# Patient Record
Sex: FEMALE | Race: ASIAN | NOT HISPANIC OR LATINO | Employment: OTHER | URBAN - METROPOLITAN AREA
[De-identification: names, ages, dates, MRNs, and addresses within clinical notes are randomized per-mention and may not be internally consistent; named-entity substitution may affect disease eponyms.]

---

## 2018-03-05 ENCOUNTER — HOSPITAL ENCOUNTER (EMERGENCY)
Facility: HOSPITAL | Age: 83
Discharge: HOME | End: 2018-03-05
Attending: EMERGENCY MEDICINE
Payer: MEDICARE

## 2018-03-05 VITALS
HEART RATE: 81 BPM | TEMPERATURE: 98 F | WEIGHT: 135.13 LBS | DIASTOLIC BLOOD PRESSURE: 70 MMHG | SYSTOLIC BLOOD PRESSURE: 143 MMHG | OXYGEN SATURATION: 98 % | RESPIRATION RATE: 18 BRPM

## 2018-03-05 DIAGNOSIS — R05.9 COUGH: ICD-10-CM

## 2018-03-05 DIAGNOSIS — R47.9 DIFFICULTY USING VERBAL COMMUNICATION: ICD-10-CM

## 2018-03-05 DIAGNOSIS — Z87.81 HX OF COMPRESSION FRACTURE OF SPINE: ICD-10-CM

## 2018-03-05 DIAGNOSIS — J18.9 PNEUMONIA OF RIGHT UPPER LOBE DUE TO INFECTIOUS ORGANISM: Primary | ICD-10-CM

## 2018-03-05 LAB
ALBUMIN SERPL BCP-MCNC: 3.9 G/DL
ALP SERPL-CCNC: 93 U/L
ALT SERPL W/O P-5'-P-CCNC: 26 U/L
ANION GAP SERPL CALC-SCNC: 12 MMOL/L
AST SERPL-CCNC: 28 U/L
BASOPHILS # BLD AUTO: 0.03 K/UL
BASOPHILS NFR BLD: 0.3 %
BILIRUB SERPL-MCNC: 0.4 MG/DL
BILIRUB UR QL STRIP: NEGATIVE
BUN SERPL-MCNC: 18 MG/DL
CALCIUM SERPL-MCNC: 9.9 MG/DL
CHLORIDE SERPL-SCNC: 101 MMOL/L
CK SERPL-CCNC: 55 U/L
CLARITY UR: CLEAR
CO2 SERPL-SCNC: 27 MMOL/L
COLOR UR: YELLOW
CREAT SERPL-MCNC: 1.2 MG/DL
D DIMER PPP IA.FEU-MCNC: 0.52 MG/L FEU
DIFFERENTIAL METHOD: ABNORMAL
EOSINOPHIL # BLD AUTO: 0.1 K/UL
EOSINOPHIL NFR BLD: 1.1 %
ERYTHROCYTE [DISTWIDTH] IN BLOOD BY AUTOMATED COUNT: 13.5 %
EST. GFR  (AFRICAN AMERICAN): 47 ML/MIN/1.73 M^2
EST. GFR  (NON AFRICAN AMERICAN): 41 ML/MIN/1.73 M^2
GLUCOSE SERPL-MCNC: 99 MG/DL
GLUCOSE UR QL STRIP: NEGATIVE
HCT VFR BLD AUTO: 38.4 %
HGB BLD-MCNC: 12.6 G/DL
HGB UR QL STRIP: NEGATIVE
KETONES UR QL STRIP: NEGATIVE
LEUKOCYTE ESTERASE UR QL STRIP: NEGATIVE
LYMPHOCYTES # BLD AUTO: 2.5 K/UL
LYMPHOCYTES NFR BLD: 25 %
MCH RBC QN AUTO: 31.1 PG
MCHC RBC AUTO-ENTMCNC: 32.8 G/DL
MCV RBC AUTO: 95 FL
MONOCYTES # BLD AUTO: 0.8 K/UL
MONOCYTES NFR BLD: 8.3 %
NEUTROPHILS # BLD AUTO: 6.6 K/UL
NEUTROPHILS NFR BLD: 65.3 %
NITRITE UR QL STRIP: NEGATIVE
PH UR STRIP: 8 [PH] (ref 5–8)
PLATELET # BLD AUTO: 283 K/UL
PMV BLD AUTO: 11.2 FL
POTASSIUM SERPL-SCNC: 3.8 MMOL/L
PROT SERPL-MCNC: 8.4 G/DL
PROT UR QL STRIP: ABNORMAL
RBC # BLD AUTO: 4.05 M/UL
SODIUM SERPL-SCNC: 140 MMOL/L
SP GR UR STRIP: 1.01 (ref 1–1.03)
TROPONIN I SERPL DL<=0.01 NG/ML-MCNC: 0.02 NG/ML
URN SPEC COLLECT METH UR: ABNORMAL
UROBILINOGEN UR STRIP-ACNC: NEGATIVE EU/DL
WBC # BLD AUTO: 10.1 K/UL

## 2018-03-05 PROCEDURE — 99285 EMERGENCY DEPT VISIT HI MDM: CPT

## 2018-03-05 PROCEDURE — 85025 COMPLETE CBC W/AUTO DIFF WBC: CPT

## 2018-03-05 PROCEDURE — 25000003 PHARM REV CODE 250: Performed by: EMERGENCY MEDICINE

## 2018-03-05 PROCEDURE — 84484 ASSAY OF TROPONIN QUANT: CPT

## 2018-03-05 PROCEDURE — 82550 ASSAY OF CK (CPK): CPT

## 2018-03-05 PROCEDURE — 85379 FIBRIN DEGRADATION QUANT: CPT

## 2018-03-05 PROCEDURE — 81003 URINALYSIS AUTO W/O SCOPE: CPT

## 2018-03-05 PROCEDURE — 80053 COMPREHEN METABOLIC PANEL: CPT

## 2018-03-05 RX ORDER — CLOPIDOGREL BISULFATE 75 MG/1
75 TABLET ORAL DAILY
Status: ON HOLD | COMMUNITY
End: 2020-12-14 | Stop reason: SDUPTHER

## 2018-03-05 RX ORDER — KETOROLAC TROMETHAMINE 5 MG/ML
1 SOLUTION OPHTHALMIC 4 TIMES DAILY
Status: ON HOLD | COMMUNITY
End: 2021-01-27 | Stop reason: SDUPTHER

## 2018-03-05 RX ORDER — LEVOFLOXACIN 500 MG/1
250 TABLET, FILM COATED ORAL DAILY
Qty: 9 TABLET | Refills: 0 | Status: SHIPPED | OUTPATIENT
Start: 2018-03-05 | End: 2018-03-14

## 2018-03-05 RX ORDER — NAPROXEN SODIUM 220 MG
220 TABLET ORAL 2 TIMES DAILY PRN
Status: ON HOLD | COMMUNITY
End: 2020-12-14

## 2018-03-05 RX ORDER — AMLODIPINE BESYLATE 10 MG/1
10 TABLET ORAL DAILY
Status: ON HOLD | COMMUNITY
End: 2020-12-14 | Stop reason: SDUPTHER

## 2018-03-05 RX ORDER — VALSARTAN AND HYDROCHLOROTHIAZIDE 320; 12.5 MG/1; MG/1
1 TABLET, FILM COATED ORAL DAILY
Status: ON HOLD | COMMUNITY
End: 2020-12-16 | Stop reason: HOSPADM

## 2018-03-05 RX ORDER — ACETAMINOPHEN 500 MG
500 TABLET ORAL EVERY 6 HOURS PRN
Status: ON HOLD | COMMUNITY
End: 2020-12-14

## 2018-03-05 RX ORDER — MELOXICAM 7.5 MG/1
7.5 TABLET ORAL DAILY
Status: ON HOLD | COMMUNITY
End: 2020-12-14

## 2018-03-05 RX ORDER — MOXIFLOXACIN HYDROCHLORIDE 400 MG/1
400 TABLET ORAL
Status: COMPLETED | OUTPATIENT
Start: 2018-03-05 | End: 2018-03-05

## 2018-03-05 RX ORDER — AMLODIPINE BESYLATE 5 MG/1
5 TABLET ORAL DAILY
Status: ON HOLD | COMMUNITY
End: 2020-12-14

## 2018-03-05 RX ORDER — IBUPROFEN 200 MG
200 TABLET ORAL EVERY 6 HOURS PRN
Status: ON HOLD | COMMUNITY
End: 2020-12-16 | Stop reason: HOSPADM

## 2018-03-05 RX ADMIN — MOXIFLOXACIN HYDROCHLORIDE 400 MG: 400 TABLET, FILM COATED ORAL at 02:03

## 2018-03-05 NOTE — ED PROVIDER NOTES
SCRIBE #1 NOTE: I, Fitz Tang, am scribing for, and in the presence of, Katina Amin DO. I have scribed the entire note.      History      Chief Complaint   Patient presents with    Chest Pain     pt reports chest pain, SOB, productive cough, and generalized body aches x1 week       Review of patient's allergies indicates:  No Known Allergies     HPI   HPI    3/5/2018, 10:10 AM   History obtained from the patient and daughter    Patient was offered MALIK services. Patient does want MALIK services at this time. Full HPI is limited due to language barrier.     History of Present Illness limited due to foreign language barrier: Dana Wyman is a 87 y.o. female patient who presents to the Emergency Department for right sided CP which onset gradually one week ago. Sxs are constant, worse today, and moderate in severity. Pain worsens with cough.  Daughter-in-law notes that she's been coughing for approximately one month.  There are no other mitigating or exacerbating factors noted. Associated sxs include back pain, cough for one month. Daughter in law reports pt has not been communicating as much as she usually does.  Patient has been difficulty communicating for over a year.  There is no numbness or weakness on one side which is new.  Daughter denies any fever, LOC, diaphoresis, leg swelling, sore throat, rhinorrhea, and all other sxs at this time. No further complaints or concerns at this time.       Arrival mode: Personal vehicle     PCP: Radha Cleary MD     Past Medical History:  Hypertension      Past Surgical History:  Past surgical history reviewed not relevant      Family History:  Family history reviewed not relevant      Social History:  No tobacco, alcohol or drugs.    ROS   Review of Systems   Constitutional: Negative for fever.   HENT: Negative for sore throat.    Respiratory: Positive for cough. Negative for shortness of breath.    Cardiovascular: Positive for chest pain.   Gastrointestinal:  Negative for abdominal pain, constipation, diarrhea, nausea and vomiting.   Genitourinary: Negative for dysuria.   Musculoskeletal: Positive for back pain.   Skin: Negative for rash.   Neurological: Negative for seizures, weakness, numbness and headaches.   Hematological: Does not bruise/bleed easily.     Physical Exam      Initial Vitals [03/05/18 0956]   BP Pulse Resp Temp SpO2   (!) 175/75 95 20 98.6 °F (37 °C) 95 %      MAP       108.33          Physical Exam   Musculoskeletal:        Thoracic back: She exhibits deformity. She exhibits no tenderness and no bony tenderness.   Kyphosis is noted.  There is no midline tenderness to the T10, T 11, T12 thoracic vertebra.     Nursing Notes and Vital Signs Reviewed.  Constitutional: Patient is in no acute distress. Well-developed and well-nourished.  Head: Atraumatic. Normocephalic.  Eyes: PERRL. EOM intact. Conjunctivae are not pale. No scleral icterus.  ENT: Mucous membranes are moist. Oropharynx is clear and symmetric.    Neck: Supple. Full ROM. No lymphadenopathy.  Cardiovascular: Regular rate. Regular rhythm. No murmurs, rubs, or gallops. Distal pulses are 2+ and symmetric.  Pulmonary/Chest: No respiratory distress. Clear to auscultation bilaterally. No wheezing or rales.  Abdominal: Soft and non-distended.  There is no tenderness.  No rebound, guarding, or rigidity. Good bowel sounds.  Genitourinary: No CVA tenderness  Musculoskeletal: Moves all extremities. No obvious deformities. No edema. No calf tenderness.  Skin: Warm and dry.  Neurological:  Alert, awake, and appropriate.  no focal deficits noted.    Psychiatric: Normal affect. Good eye contact.     ED Course    Procedures  ED Vital Signs:  Vitals:    03/05/18 0956 03/05/18 1000 03/05/18 1030 03/05/18 1100   BP: (!) 175/75 (!) 175/82 (!) 182/110 (!) 176/76   Pulse: 95 96 97 93   Resp: 20 20 18 16   Temp: 98.6 °F (37 °C)      TempSrc: Oral      SpO2: 95% 97% 98% 96%   Weight: 61.3 kg (135 lb 2.3 oz)        03/05/18 1130 03/05/18 1200 03/05/18 1230 03/05/18 1300   BP:   (!) 191/81 (!) 198/81   Pulse: 90 82 97 101   Resp: 20 (!) 21 18 18   Temp:       TempSrc:       SpO2: 95% (!) 94% 98% (!) 93%   Weight:        03/05/18 1330 03/05/18 1400 03/05/18 1430 03/05/18 1451   BP: (!) 168/77 (!) 151/60 (!) 157/75 (!) 173/95   Pulse: 90 83 84 98   Resp: 11 18 13 16   Temp:    98.3 °F (36.8 °C)   TempSrc:    Oral   SpO2: (!) 93% (!) 90% 96% 98%   Weight:        03/05/18 1500 03/05/18 1530 03/05/18 1600   BP: (!) 163/79 (!) 155/74 (!) 144/67   Pulse: 84 77 79   Resp: 18 (!) 24 20   Temp:      TempSrc:      SpO2: 96% 96% 96%   Weight:          Abnormal Lab Results:  Labs Reviewed   CBC W/ AUTO DIFFERENTIAL - Abnormal; Notable for the following:        Result Value    MCH 31.1 (*)     All other components within normal limits   COMPREHENSIVE METABOLIC PANEL - Abnormal; Notable for the following:     eGFR if  47 (*)     eGFR if non  41 (*)     All other components within normal limits   D DIMER, QUANTITATIVE - Abnormal; Notable for the following:     D-Dimer 0.52 (*)     All other components within normal limits   URINALYSIS - Abnormal; Notable for the following:     Protein, UA Trace (*)     All other components within normal limits   TROPONIN I   CK        All Lab Results:  Results for orders placed or performed during the hospital encounter of 03/05/18   CBC auto differential   Result Value Ref Range    WBC 10.10 3.90 - 12.70 K/uL    RBC 4.05 4.00 - 5.40 M/uL    Hemoglobin 12.6 12.0 - 16.0 g/dL    Hematocrit 38.4 37.0 - 48.5 %    MCV 95 82 - 98 fL    MCH 31.1 (H) 27.0 - 31.0 pg    MCHC 32.8 32.0 - 36.0 g/dL    RDW 13.5 11.5 - 14.5 %    Platelets 283 150 - 350 K/uL    MPV 11.2 9.2 - 12.9 fL    Gran # (ANC) 6.6 1.8 - 7.7 K/uL    Lymph # 2.5 1.0 - 4.8 K/uL    Mono # 0.8 0.3 - 1.0 K/uL    Eos # 0.1 0.0 - 0.5 K/uL    Baso # 0.03 0.00 - 0.20 K/uL    Gran% 65.3 38.0 - 73.0 %    Lymph% 25.0 18.0 - 48.0 %     Mono% 8.3 4.0 - 15.0 %    Eosinophil% 1.1 0.0 - 8.0 %    Basophil% 0.3 0.0 - 1.9 %    Differential Method Automated    Comprehensive metabolic panel   Result Value Ref Range    Sodium 140 136 - 145 mmol/L    Potassium 3.8 3.5 - 5.1 mmol/L    Chloride 101 95 - 110 mmol/L    CO2 27 23 - 29 mmol/L    Glucose 99 70 - 110 mg/dL    BUN, Bld 18 8 - 23 mg/dL    Creatinine 1.2 0.5 - 1.4 mg/dL    Calcium 9.9 8.7 - 10.5 mg/dL    Total Protein 8.4 6.0 - 8.4 g/dL    Albumin 3.9 3.5 - 5.2 g/dL    Total Bilirubin 0.4 0.1 - 1.0 mg/dL    Alkaline Phosphatase 93 55 - 135 U/L    AST 28 10 - 40 U/L    ALT 26 10 - 44 U/L    Anion Gap 12 8 - 16 mmol/L    eGFR if African American 47 (A) >60 mL/min/1.73 m^2    eGFR if non African American 41 (A) >60 mL/min/1.73 m^2   Troponin I   Result Value Ref Range    Troponin I 0.018 0.000 - 0.026 ng/mL   CK   Result Value Ref Range    CPK 55 20 - 180 U/L   D dimer, quantitative   Result Value Ref Range    D-Dimer 0.52 (H) <0.50 mg/L FEU   Urinalysis   Result Value Ref Range    Specimen UA Urine, Clean Catch     Color, UA Yellow Yellow, Straw, Melany    Appearance, UA Clear Clear    pH, UA 8.0 5.0 - 8.0    Specific Gravity, UA 1.010 1.005 - 1.030    Protein, UA Trace (A) Negative    Glucose, UA Negative Negative    Ketones, UA Negative Negative    Bilirubin (UA) Negative Negative    Occult Blood UA Negative Negative    Nitrite, UA Negative Negative    Urobilinogen, UA Negative <2.0 EU/dL    Leukocytes, UA Negative Negative         Imaging Results:  Imaging Results          CT Head Without Contrast (Final result)  Result time 03/05/18 12:20:47    Final result by Romero Reed III, MD (03/05/18 12:20:47)                 Impression:     Mild atrophy with minimal to mild white matter changes which may be related to microvascular disease. No bleed or other acute intracranial event suggested.    All CT scans at this facility use dose modulation, iterative reconstruction, and/or weight based dosing when  appropriate to reduce radiation dose to as low as reasonably achievable.      Electronically signed by: FLORENTINO THOMAS MD  Date:     03/05/18  Time:    12:20              Narrative:    CT of the head without contrast.    Clinical indication: difficulty communicating .The    Standard noncontrast CT scan of the brain. No previous for comparison.    The brain and ventricles show no mass, hydrocephalus, hemorrhage, or midline shift. There are mild changes of atrophy with minimal bilateral white matter changes. No acute/depressed skull fracture.                             X-Ray Chest PA And Lateral (Final result)  Result time 03/05/18 11:21:47    Final result by CORWIN Clinton Sr., MD (03/05/18 11:21:47)                 Impression:        1. There is a mild amount of alveolar consolidation in the right upper lobe. This is consistent with the patient's history and characteristic of pneumonia.  2. There is a severe compression fracture of T11.  3. Osteoporosis  4. There is a mild amount of dextroconvex curvature of the thoracic spine.   5. There is mild cardiomegaly.   6. There is a moderate amount of atherosclerosis.      Electronically signed by: CORWIN CLINTON MD  Date:     03/05/18  Time:    11:21              Narrative:    Two-view chest x-ray    Clinical History:  Cough    Finding: There is mild cardiomegaly. There is a mild amount of alveolar consolidation in the right upper lobe. The left lung is clear. There is no pneumothorax or pleural effusion. There is a mild amount of dextroconvex curvature of the thoracic spine. There is a severe compression fracture of T11. There is diffuse bony demineralization. There is a moderate amount of atherosclerosis.                                        The EKG was ordered, reviewed, and independently interpreted by the ED provider.  Interpretation time: 10:05  Rate: 104 BPM  Rhythm: sinus tachycardia  Interpretation: Nonspecific ST abnormality. Abnormal QRST angle. No  STEMI.      The Emergency Provider reviewed the vital signs and test results, which are outlined above.    ED Discussion     2:45 PM: Reassessed pt. Spoke with Barbara (pt's daughter [phone]) over the phone concerning pt diagnosis of PNA and plan to discharge pt home on PO ABX. Discussed with daughter all pertinent ED information and results. Discussed plan of treatment with daughter. Gave daughter all f/u and return to the ED instructions. All questions and concerns were addressed at this time. Daughter understands and agrees to plan as discussed. Pt is stable for discharge.     I have discussed with patient and/or family/caretaker chest pain precautions, specifically to return for worsening chest pain, shortness of breath, fever, or any concern.  I have low suspicion for cardiopulmonary, vascular, infectious, respiratory, or other emergent medical condition based on my evaluation in the ED.    ED Medication(s):  Medications   moxifloxacin tablet 400 mg (400 mg Oral Given 3/5/18 1450)       New Prescriptions    LEVOFLOXACIN (LEVAQUIN) 500 MG TABLET    Take 0.5 tablets (250 mg total) by mouth once daily.       Follow-up Information     Radha Cleary MD In 2 days.    Specialty:  Cardiology  Why:  Return to the ED:  for confusion, difficulty beathing, weakness or other concerns.  Contact information:  [address] [facility] [address] [phone]                     Medical Decision Making    Medical Decision Making:   Clinical Tests:   Lab Tests: Reviewed and Ordered  Radiological Study: Reviewed and Ordered  Medical Tests: Reviewed and Ordered           Scribe Attestation:   Scribe #1: I performed the above scribed service and the documentation accurately describes the services I performed. I attest to the accuracy of the note.    Attending:   Physician Attestation Statement for Scribe #1: I, Katina Amin DO, personally performed the services described in this documentation, as scribed by Fitz  Oneal Tang, in my presence, and it is both accurate and complete.          Clinical Impression       ICD-10-CM ICD-9-CM   1. Pneumonia of right upper lobe due to infectious organism J18.1 486   2. Difficulty using verbal communication F80.9 784.59   3. Cough R05 786.2   4. Hx of compression fracture of spine - T11 Z87.81 V15.51       Disposition:   Disposition: Discharged  Condition: Stable         Katina Amin, DO  03/05/18 1926

## 2020-12-13 ENCOUNTER — HOSPITAL ENCOUNTER (INPATIENT)
Facility: HOSPITAL | Age: 85
LOS: 2 days | Discharge: HOME HEALTH | DRG: 291 | End: 2020-12-16
Attending: EMERGENCY MEDICINE | Admitting: FAMILY MEDICINE
Payer: MEDICARE

## 2020-12-13 DIAGNOSIS — R60.1 ANASARCA: ICD-10-CM

## 2020-12-13 DIAGNOSIS — I50.9 ACUTE CONGESTIVE HEART FAILURE, UNSPECIFIED HEART FAILURE TYPE: Primary | ICD-10-CM

## 2020-12-13 DIAGNOSIS — I48.91 ATRIAL FIBRILLATION, UNSPECIFIED TYPE: ICD-10-CM

## 2020-12-13 DIAGNOSIS — R79.89 TROPONIN LEVEL ELEVATED: ICD-10-CM

## 2020-12-13 DIAGNOSIS — J18.9 PNEUMONIA OF RIGHT UPPER LOBE DUE TO INFECTIOUS ORGANISM: ICD-10-CM

## 2020-12-13 DIAGNOSIS — I50.41 ACUTE COMBINED SYSTOLIC AND DIASTOLIC CONGESTIVE HEART FAILURE: ICD-10-CM

## 2020-12-13 DIAGNOSIS — I50.43 ACUTE ON CHRONIC COMBINED SYSTOLIC AND DIASTOLIC CONGESTIVE HEART FAILURE: ICD-10-CM

## 2020-12-13 DIAGNOSIS — I48.0 PAROXYSMAL ATRIAL FIBRILLATION: ICD-10-CM

## 2020-12-13 DIAGNOSIS — R91.8 MASS OF UPPER LOBE OF RIGHT LUNG: ICD-10-CM

## 2020-12-13 DIAGNOSIS — I50.31 ACUTE DIASTOLIC CONGESTIVE HEART FAILURE: ICD-10-CM

## 2020-12-13 DIAGNOSIS — I16.1 HYPERTENSIVE EMERGENCY: ICD-10-CM

## 2020-12-13 DIAGNOSIS — R60.9 EDEMA: ICD-10-CM

## 2020-12-13 PROBLEM — I25.10 CORONARY ARTERY DISEASE INVOLVING NATIVE CORONARY ARTERY: Status: ACTIVE | Noted: 2018-09-06

## 2020-12-13 PROBLEM — G30.9 ALZHEIMER'S DEMENTIA WITH BEHAVIORAL DISTURBANCE: Status: ACTIVE | Noted: 2019-03-15

## 2020-12-13 PROBLEM — I10 ESSENTIAL HYPERTENSION: Status: ACTIVE | Noted: 2018-09-06

## 2020-12-13 PROBLEM — I65.29 CAROTID STENOSIS: Status: RESOLVED | Noted: 2018-11-11 | Resolved: 2020-12-13

## 2020-12-13 PROBLEM — I48.19 PERSISTENT ATRIAL FIBRILLATION: Status: ACTIVE | Noted: 2018-09-06

## 2020-12-13 PROBLEM — H91.93 BILATERAL HEARING LOSS: Status: ACTIVE | Noted: 2019-03-14

## 2020-12-13 PROBLEM — F02.818 ALZHEIMER'S DEMENTIA WITH BEHAVIORAL DISTURBANCE: Status: ACTIVE | Noted: 2019-03-15

## 2020-12-13 PROBLEM — N18.30 CKD (CHRONIC KIDNEY DISEASE), STAGE III: Status: ACTIVE | Noted: 2018-09-22

## 2020-12-13 PROBLEM — I65.29 CAROTID STENOSIS: Status: ACTIVE | Noted: 2018-11-11

## 2020-12-13 PROBLEM — I65.23 BILATERAL CAROTID ARTERY STENOSIS: Status: ACTIVE | Noted: 2018-09-08

## 2020-12-13 LAB
ALBUMIN SERPL BCP-MCNC: 2.8 G/DL (ref 3.5–5.2)
ALP SERPL-CCNC: 116 U/L (ref 55–135)
ALT SERPL W/O P-5'-P-CCNC: 18 U/L (ref 10–44)
ANION GAP SERPL CALC-SCNC: 7 MMOL/L (ref 8–16)
APTT BLDCRRT: 29.9 SEC (ref 21–32)
AST SERPL-CCNC: 31 U/L (ref 10–40)
BACTERIA #/AREA URNS HPF: ABNORMAL /HPF
BASOPHILS # BLD AUTO: 0.04 K/UL (ref 0–0.2)
BASOPHILS NFR BLD: 0.8 % (ref 0–1.9)
BILIRUB SERPL-MCNC: 0.6 MG/DL (ref 0.1–1)
BILIRUB UR QL STRIP: NEGATIVE
BNP SERPL-MCNC: 2844 PG/ML (ref 0–99)
BUN SERPL-MCNC: 23 MG/DL (ref 8–23)
CALCIUM SERPL-MCNC: 8.7 MG/DL (ref 8.7–10.5)
CHLORIDE SERPL-SCNC: 107 MMOL/L (ref 95–110)
CLARITY UR: CLEAR
CO2 SERPL-SCNC: 26 MMOL/L (ref 23–29)
COLOR UR: YELLOW
CREAT SERPL-MCNC: 1.7 MG/DL (ref 0.5–1.4)
DIFFERENTIAL METHOD: ABNORMAL
EOSINOPHIL # BLD AUTO: 0.3 K/UL (ref 0–0.5)
EOSINOPHIL NFR BLD: 5.2 % (ref 0–8)
ERYTHROCYTE [DISTWIDTH] IN BLOOD BY AUTOMATED COUNT: 18 % (ref 11.5–14.5)
EST. GFR  (AFRICAN AMERICAN): 30 ML/MIN/1.73 M^2
EST. GFR  (NON AFRICAN AMERICAN): 26 ML/MIN/1.73 M^2
GLUCOSE SERPL-MCNC: 109 MG/DL (ref 70–110)
GLUCOSE UR QL STRIP: NEGATIVE
HCT VFR BLD AUTO: 37.2 % (ref 37–48.5)
HGB BLD-MCNC: 11.1 G/DL (ref 12–16)
HGB UR QL STRIP: ABNORMAL
HYALINE CASTS #/AREA URNS LPF: 0 /LPF
IMM GRANULOCYTES # BLD AUTO: 0.01 K/UL (ref 0–0.04)
IMM GRANULOCYTES NFR BLD AUTO: 0.2 % (ref 0–0.5)
INR PPP: 1.1 (ref 0.8–1.2)
KETONES UR QL STRIP: NEGATIVE
LACTATE SERPL-SCNC: 1.2 MMOL/L (ref 0.5–2.2)
LACTATE SERPL-SCNC: 1.7 MMOL/L (ref 0.5–2.2)
LEUKOCYTE ESTERASE UR QL STRIP: NEGATIVE
LYMPHOCYTES # BLD AUTO: 1.3 K/UL (ref 1–4.8)
LYMPHOCYTES NFR BLD: 26.9 % (ref 18–48)
MAGNESIUM SERPL-MCNC: 2.2 MG/DL (ref 1.6–2.6)
MCH RBC QN AUTO: 27.1 PG (ref 27–31)
MCHC RBC AUTO-ENTMCNC: 29.8 G/DL (ref 32–36)
MCV RBC AUTO: 91 FL (ref 82–98)
MICROSCOPIC COMMENT: ABNORMAL
MONOCYTES # BLD AUTO: 0.4 K/UL (ref 0.3–1)
MONOCYTES NFR BLD: 8.1 % (ref 4–15)
NEUTROPHILS # BLD AUTO: 2.8 K/UL (ref 1.8–7.7)
NEUTROPHILS NFR BLD: 58.8 % (ref 38–73)
NITRITE UR QL STRIP: NEGATIVE
NRBC BLD-RTO: 0 /100 WBC
PH UR STRIP: 7 [PH] (ref 5–8)
PHOSPHATE SERPL-MCNC: 2.9 MG/DL (ref 2.7–4.5)
PHOSPHATE SERPL-MCNC: 3.3 MG/DL (ref 2.7–4.5)
PLATELET # BLD AUTO: 155 K/UL (ref 150–350)
PMV BLD AUTO: ABNORMAL FL (ref 9.2–12.9)
POTASSIUM SERPL-SCNC: 4 MMOL/L (ref 3.5–5.1)
PROT SERPL-MCNC: 7.4 G/DL (ref 6–8.4)
PROT UR QL STRIP: ABNORMAL
PROTHROMBIN TIME: 11.6 SEC (ref 9–12.5)
RBC # BLD AUTO: 4.1 M/UL (ref 4–5.4)
RBC #/AREA URNS HPF: 5 /HPF (ref 0–4)
SARS-COV-2 RDRP RESP QL NAA+PROBE: NEGATIVE
SODIUM SERPL-SCNC: 140 MMOL/L (ref 136–145)
SP GR UR STRIP: 1.01 (ref 1–1.03)
TROPONIN I SERPL DL<=0.01 NG/ML-MCNC: 0.03 NG/ML (ref 0–0.03)
URN SPEC COLLECT METH UR: ABNORMAL
UROBILINOGEN UR STRIP-ACNC: NEGATIVE EU/DL
WBC # BLD AUTO: 4.83 K/UL (ref 3.9–12.7)
WBC #/AREA URNS HPF: 1 /HPF (ref 0–5)

## 2020-12-13 PROCEDURE — 83605 ASSAY OF LACTIC ACID: CPT

## 2020-12-13 PROCEDURE — U0002 COVID-19 LAB TEST NON-CDC: HCPCS

## 2020-12-13 PROCEDURE — 99291 CRITICAL CARE FIRST HOUR: CPT | Mod: 25

## 2020-12-13 PROCEDURE — 99203 PR OFFICE/OUTPT VISIT, NEW, LEVL III, 30-44 MIN: ICD-10-PCS | Mod: 25,,, | Performed by: INTERNAL MEDICINE

## 2020-12-13 PROCEDURE — G0378 HOSPITAL OBSERVATION PER HR: HCPCS

## 2020-12-13 PROCEDURE — 96365 THER/PROPH/DIAG IV INF INIT: CPT

## 2020-12-13 PROCEDURE — 94761 N-INVAS EAR/PLS OXIMETRY MLT: CPT

## 2020-12-13 PROCEDURE — 99900035 HC TECH TIME PER 15 MIN (STAT)

## 2020-12-13 PROCEDURE — 96375 TX/PRO/DX INJ NEW DRUG ADDON: CPT

## 2020-12-13 PROCEDURE — 83735 ASSAY OF MAGNESIUM: CPT

## 2020-12-13 PROCEDURE — 93005 ELECTROCARDIOGRAM TRACING: CPT

## 2020-12-13 PROCEDURE — 87040 BLOOD CULTURE FOR BACTERIA: CPT

## 2020-12-13 PROCEDURE — 84484 ASSAY OF TROPONIN QUANT: CPT

## 2020-12-13 PROCEDURE — 36415 COLL VENOUS BLD VENIPUNCTURE: CPT

## 2020-12-13 PROCEDURE — 84100 ASSAY OF PHOSPHORUS: CPT

## 2020-12-13 PROCEDURE — 93010 ELECTROCARDIOGRAM REPORT: CPT | Mod: ,,, | Performed by: INTERNAL MEDICINE

## 2020-12-13 PROCEDURE — 96376 TX/PRO/DX INJ SAME DRUG ADON: CPT

## 2020-12-13 PROCEDURE — 85610 PROTHROMBIN TIME: CPT

## 2020-12-13 PROCEDURE — 25000003 PHARM REV CODE 250: Performed by: FAMILY MEDICINE

## 2020-12-13 PROCEDURE — 25000003 PHARM REV CODE 250: Performed by: EMERGENCY MEDICINE

## 2020-12-13 PROCEDURE — 80053 COMPREHEN METABOLIC PANEL: CPT

## 2020-12-13 PROCEDURE — 85025 COMPLETE CBC W/AUTO DIFF WBC: CPT

## 2020-12-13 PROCEDURE — 63600175 PHARM REV CODE 636 W HCPCS: Performed by: FAMILY MEDICINE

## 2020-12-13 PROCEDURE — 63600175 PHARM REV CODE 636 W HCPCS: Performed by: EMERGENCY MEDICINE

## 2020-12-13 PROCEDURE — 85730 THROMBOPLASTIN TIME PARTIAL: CPT

## 2020-12-13 PROCEDURE — 81000 URINALYSIS NONAUTO W/SCOPE: CPT

## 2020-12-13 PROCEDURE — 27000221 HC OXYGEN, UP TO 24 HOURS

## 2020-12-13 PROCEDURE — 84100 ASSAY OF PHOSPHORUS: CPT | Mod: 91

## 2020-12-13 PROCEDURE — 83880 ASSAY OF NATRIURETIC PEPTIDE: CPT

## 2020-12-13 PROCEDURE — 99203 OFFICE O/P NEW LOW 30 MIN: CPT | Mod: 25,,, | Performed by: INTERNAL MEDICINE

## 2020-12-13 PROCEDURE — 93010 EKG 12-LEAD: ICD-10-PCS | Mod: ,,, | Performed by: INTERNAL MEDICINE

## 2020-12-13 RX ORDER — FUROSEMIDE 10 MG/ML
40 INJECTION INTRAMUSCULAR; INTRAVENOUS
Status: DISCONTINUED | OUTPATIENT
Start: 2020-12-13 | End: 2020-12-16 | Stop reason: HOSPADM

## 2020-12-13 RX ORDER — DOXYCYCLINE HYCLATE 100 MG
100 TABLET ORAL EVERY 12 HOURS
Status: DISCONTINUED | OUTPATIENT
Start: 2020-12-13 | End: 2020-12-16 | Stop reason: HOSPADM

## 2020-12-13 RX ORDER — ASPIRIN 325 MG
325 TABLET ORAL
Status: COMPLETED | OUTPATIENT
Start: 2020-12-13 | End: 2020-12-13

## 2020-12-13 RX ORDER — FUROSEMIDE 10 MG/ML
60 INJECTION INTRAMUSCULAR; INTRAVENOUS
Status: COMPLETED | OUTPATIENT
Start: 2020-12-13 | End: 2020-12-13

## 2020-12-13 RX ORDER — FAMOTIDINE 20 MG/1
20 TABLET, FILM COATED ORAL 2 TIMES DAILY
Status: DISCONTINUED | OUTPATIENT
Start: 2020-12-13 | End: 2020-12-13

## 2020-12-13 RX ORDER — ATORVASTATIN CALCIUM 40 MG/1
40 TABLET, FILM COATED ORAL DAILY
Status: DISCONTINUED | OUTPATIENT
Start: 2020-12-14 | End: 2020-12-16 | Stop reason: HOSPADM

## 2020-12-13 RX ORDER — ATORVASTATIN CALCIUM 40 MG/1
40 TABLET, FILM COATED ORAL DAILY
COMMUNITY

## 2020-12-13 RX ORDER — LOSARTAN POTASSIUM 25 MG/1
25 TABLET ORAL DAILY
Status: ON HOLD | COMMUNITY
End: 2020-12-14 | Stop reason: SDUPTHER

## 2020-12-13 RX ORDER — ACETAMINOPHEN 325 MG/1
650 TABLET ORAL EVERY 4 HOURS PRN
Status: DISCONTINUED | OUTPATIENT
Start: 2020-12-13 | End: 2020-12-16 | Stop reason: HOSPADM

## 2020-12-13 RX ORDER — HEPARIN SODIUM,PORCINE/D5W 25000/250
0-40 INTRAVENOUS SOLUTION INTRAVENOUS CONTINUOUS
Status: DISCONTINUED | OUTPATIENT
Start: 2020-12-13 | End: 2020-12-15

## 2020-12-13 RX ORDER — LABETALOL HYDROCHLORIDE 5 MG/ML
20 INJECTION, SOLUTION INTRAVENOUS
Status: COMPLETED | OUTPATIENT
Start: 2020-12-13 | End: 2020-12-13

## 2020-12-13 RX ORDER — CETIRIZINE HYDROCHLORIDE 10 MG/1
10 TABLET ORAL DAILY
Status: ON HOLD | COMMUNITY
End: 2020-12-14

## 2020-12-13 RX ORDER — FAMOTIDINE 20 MG/1
20 TABLET, FILM COATED ORAL DAILY
Status: DISCONTINUED | OUTPATIENT
Start: 2020-12-14 | End: 2020-12-16 | Stop reason: HOSPADM

## 2020-12-13 RX ORDER — ONDANSETRON 8 MG/1
8 TABLET, ORALLY DISINTEGRATING ORAL EVERY 8 HOURS PRN
Status: DISCONTINUED | OUTPATIENT
Start: 2020-12-13 | End: 2020-12-16 | Stop reason: HOSPADM

## 2020-12-13 RX ORDER — SODIUM CHLORIDE 0.9 % (FLUSH) 0.9 %
10 SYRINGE (ML) INJECTION
Status: DISCONTINUED | OUTPATIENT
Start: 2020-12-13 | End: 2020-12-16 | Stop reason: HOSPADM

## 2020-12-13 RX ADMIN — NITROGLYCERIN 1 INCH: 20 OINTMENT TOPICAL at 01:12

## 2020-12-13 RX ADMIN — FUROSEMIDE 60 MG: 10 INJECTION, SOLUTION INTRAMUSCULAR; INTRAVENOUS at 12:12

## 2020-12-13 RX ADMIN — DOXYCYCLINE HYCLATE 100 MG: 100 TABLET, COATED ORAL at 08:12

## 2020-12-13 RX ADMIN — DOXYCYCLINE HYCLATE 100 MG: 100 TABLET, COATED ORAL at 03:12

## 2020-12-13 RX ADMIN — CEFTRIAXONE 1 G: 1 INJECTION, SOLUTION INTRAVENOUS at 02:12

## 2020-12-13 RX ADMIN — ASPIRIN 325 MG ORAL TABLET 325 MG: 325 PILL ORAL at 01:12

## 2020-12-13 RX ADMIN — FUROSEMIDE 40 MG: 10 INJECTION, SOLUTION INTRAMUSCULAR; INTRAVENOUS at 05:12

## 2020-12-13 RX ADMIN — LABETALOL HYDROCHLORIDE 20 MG: 5 INJECTION INTRAVENOUS at 01:12

## 2020-12-13 RX ADMIN — HEPARIN SODIUM AND DEXTROSE 12 UNITS/KG/HR: 10000; 5 INJECTION INTRAVENOUS at 11:12

## 2020-12-13 NOTE — CONSULTS
[facility]  Cardiology  Consult Note    Patient Name: Dana Wyman  MRN: [id number]  Admission Date: 12/13/2020  Hospital Length of Stay: 0 days  Code Status: Full Code   Attending Provider: Bry Jensen DO   Consulting Provider: Hansel Gray MD  Primary Care Physician: Radha Cleary MD  Principal Problem:Acute congestive heart failure    Patient information was obtained from patient and ER records.     Inpatient consult to Cardiology  Consult performed by: Hansel Gray MD  Consult ordered by: Bry Jensen DO        Subjective:       HPI:   88 yo female, consult for CHF and bradycardia  PMH CAD s/p PCI at [facility], HTN chronic afib on Plavix, lung mass stable, CKD. Ukrainian speaking    Admitted for SOB and leg swelling up to lower abd.  In ER, /119 mmHG and EKG showed Afib HR 79. Had one dose of labetalol IVP.  BNP 2844 Troponin 0.034, HGN 11 and Cr 1.7  CXR pulm edema  EKG Afib  CT chest abd showed anasarca, pleural effusion and lung mass        Past Medical History:   Diagnosis Date    Aortic atherosclerosis     Atrial fibrillation     Coronary artery disease     h/o stent in LAD and circumflex    Dementia     Hearing impairment     Hypertension     Mass of upper lobe of right lung     chronic/stable. was followed by pulm at [facility] and opted for no diagnosis/tx due to slow growth/age.       Past Surgical History:   Procedure Laterality Date    HYSTERECTOMY         Review of patient's allergies indicates:  No Known Allergies    No current facility-administered medications on file prior to encounter.      Current Outpatient Medications on File Prior to Encounter   Medication Sig    valsartan-hydrochlorothiazide (DIOVAN-HCT) 320-12.5 mg per tablet Take 1 tablet by mouth once daily.    acetaminophen (TYLENOL) 500 MG tablet Take 500 mg by mouth every 6 (six) hours as needed for Pain.    albuterol sulfate (VENTOLIN INHL) Inhale into the lungs.    amLODIPine (NORVASC) 10 MG tablet Take  10 mg by mouth once daily.    amLODIPine (NORVASC) 5 MG tablet Take 5 mg by mouth once daily.    aspirin-caffeine (LYNDSEY BACK AND BODY) 500-32.5 mg Tab Take by mouth 2 (two) times daily as needed.    atorvastatin (LIPITOR) 40 MG tablet Take 40 mg by mouth once daily.    cetirizine (ZYRTEC) 10 MG tablet Take 10 mg by mouth once daily.    clopidogrel (PLAVIX) 75 mg tablet Take 75 mg by mouth once daily.    ibuprofen (ADVIL,MOTRIN) 200 MG tablet Take 200 mg by mouth every 6 (six) hours as needed for Pain.    ketorolac 0.5% (ACULAR) 0.5 % Drop 1 drop 4 (four) times daily.    losartan (COZAAR) 25 MG tablet Take 25 mg by mouth once daily.    meloxicam (MOBIC) 7.5 MG tablet Take 7.5 mg by mouth once daily.    naproxen sodium (ANAPROX) 220 MG tablet Take 220 mg by mouth 2 (two) times daily as needed.    ranitidine (ZANTAC) 150 MG tablet Take 150 mg by mouth 2 (two) times daily as needed for Heartburn.     Family History     None        Tobacco Use    Smoking status: Never Smoker   Substance and Sexual Activity    Alcohol use: No    Drug use: No    Sexual activity: Not on file     Review of Systems   Constitution: Positive for malaise/fatigue. Negative for decreased appetite, diaphoresis, fever and night sweats.   HENT: Negative for nosebleeds.    Eyes: Negative for blurred vision and double vision.   Cardiovascular: Positive for dyspnea on exertion and leg swelling. Negative for chest pain, claudication, irregular heartbeat, near-syncope, orthopnea, palpitations, paroxysmal nocturnal dyspnea and syncope.   Respiratory: Negative for cough, shortness of breath, sleep disturbances due to breathing, snoring, sputum production and wheezing.    Endocrine: Negative for cold intolerance and polyuria.   Hematologic/Lymphatic: Does not bruise/bleed easily.   Skin: Negative for rash.   Musculoskeletal: Negative for back pain, falls, joint pain, joint swelling and neck pain.   Gastrointestinal: Negative for abdominal  pain, heartburn, nausea and vomiting.   Genitourinary: Negative for dysuria, frequency and hematuria.   Neurological: Negative for difficulty with concentration, dizziness, focal weakness, headaches, light-headedness, numbness, seizures and weakness.   Psychiatric/Behavioral: Negative for depression, memory loss and substance abuse. The patient does not have insomnia.    Allergic/Immunologic: Negative for HIV exposure and hives.     Objective:     Vital Signs (Most Recent):  Temp: 97.2 °F (36.2 °C) (12/13/20 1553)  Pulse: (!) 48 (12/13/20 1553)  Resp: 20 (12/13/20 1553)  BP: (!) 168/72 (12/13/20 1553)  SpO2: 100 % (12/13/20 1553) Vital Signs (24h Range):  Temp:  [97.2 °F (36.2 °C)-98.6 °F (37 °C)] 97.2 °F (36.2 °C)  Pulse:  [48-78] 48  Resp:  [17-24] 20  SpO2:  [97 %-100 %] 100 %  BP: (166-239)/() 168/72     Weight: 66.4 kg (146 lb 6.2 oz)  There is no height or weight on file to calculate BMI.    SpO2: 100 %  O2 Device (Oxygen Therapy): nasal cannula      Intake/Output Summary (Last 24 hours) at 12/13/2020 1703  Last data filed at 12/13/2020 1514  Gross per 24 hour   Intake 50 ml   Output 2000 ml   Net -1950 ml       Lines/Drains/Airways     Drain            Female External Urinary Catheter 12/13/20 1220 less than 1 day          Peripheral Intravenous Line                 Peripheral IV - Single Lumen 12/13/20 1136 20 G Right Antecubital less than 1 day                Physical Exam   Constitutional: She appears well-nourished.   HENT:   Head: Normocephalic.   Eyes: Pupils are equal, round, and reactive to light.   Neck: Normal carotid pulses and no JVD present. Carotid bruit is not present. No thyromegaly present.   Cardiovascular: Normal heart sounds and normal pulses. An irregularly irregular rhythm present.  No extrasystoles are present. Bradycardia present. PMI is not displaced. Exam reveals no gallop and no S3.   No murmur heard.  Pulmonary/Chest: No stridor. No respiratory distress. She has decreased  breath sounds in the right lower field and the left lower field.   Abdominal: Soft. Bowel sounds are normal. There is no abdominal tenderness. There is no rebound.   Musculoskeletal: Normal range of motion.   Skin: Skin is intact. No rash noted.       Significant Labs:   ABG: No results for input(s): PH, PCO2, HCO3, POCSATURATED, BE in the last 48 hours., Blood Culture: No results for input(s): LABBLOO in the last 48 hours., BMP:   Recent Labs   Lab 12/13/20  1135         K 4.0      CO2 26   BUN 23   CREATININE 1.7*   CALCIUM 8.7   MG 2.2   , CMP   Recent Labs   Lab 12/13/20  1135      K 4.0      CO2 26      BUN 23   CREATININE 1.7*   CALCIUM 8.7   PROT 7.4   ALBUMIN 2.8*   BILITOT 0.6   ALKPHOS 116   AST 31   ALT 18   ANIONGAP 7*   ESTGFRAFRICA 30*   EGFRNONAA 26*   , CBC   Recent Labs   Lab 12/13/20  1135   WBC 4.83   HGB 11.1*   HCT 37.2      , INR No results for input(s): INR, PROTIME in the last 48 hours., Lipid Panel No results for input(s): CHOL, HDL, LDLCALC, TRIG, CHOLHDL in the last 48 hours. and Troponin   Recent Labs   Lab 12/13/20  1135   TROPONINI 0.034*       Significant Imaging: EKG:      Assessment and Plan:     * Acute congestive heart failure  CHF and anasarca    Echo in AM  Continue diuresis  DASh and fluid restriction      Elevated troponin  Demanding ischemia from CHF and uncontrolled HTN    Trending troponin    Persistent atrial fibrillation  Bradycardia. Asymptomatic. Caused by Labetalol given in ER    Avoid BB and CCB  Start Heparin gtt      Essential hypertension  Amlodipine and ARB as indicated per   DASH    Coronary artery disease involving native coronary artery  Continue ASA and Lipitor          VTE Risk Mitigation (From admission, onward)         Ordered     apixaban tablet 2.5 mg  2 times daily      12/13/20 1731     IP VTE HIGH RISK PATIENT  Once      12/13/20 1543     Place sequential compression device  Until discontinued      12/13/20  0952                Thank you for your consult. I will follow-up with patient. Please contact us if you have any additional questions.    Hansel Gray MD  Cardiology   [facility]

## 2020-12-13 NOTE — ASSESSMENT & PLAN NOTE
Complicates communication. She speaks Mohawk but daughter reports that she doesn't always comprehend

## 2020-12-13 NOTE — SUBJECTIVE & OBJECTIVE
Past Medical History:   Diagnosis Date    Aortic atherosclerosis     Atrial fibrillation     Coronary artery disease     h/o stent in LAD and circumflex    Dementia     Hearing impairment     Hypertension     Mass of upper lobe of right lung     chronic/stable. was followed by pulm at [facility] and opted for no diagnosis/tx due to slow growth/age.       Past Surgical History:   Procedure Laterality Date    HYSTERECTOMY         Review of patient's allergies indicates:  No Known Allergies    No current facility-administered medications on file prior to encounter.      Current Outpatient Medications on File Prior to Encounter   Medication Sig    valsartan-hydrochlorothiazide (DIOVAN-HCT) 320-12.5 mg per tablet Take 1 tablet by mouth once daily.    acetaminophen (TYLENOL) 500 MG tablet Take 500 mg by mouth every 6 (six) hours as needed for Pain.    albuterol sulfate (VENTOLIN INHL) Inhale into the lungs.    amLODIPine (NORVASC) 10 MG tablet Take 10 mg by mouth once daily.    amLODIPine (NORVASC) 5 MG tablet Take 5 mg by mouth once daily.    aspirin-caffeine (LYNDSEY BACK AND BODY) 500-32.5 mg Tab Take by mouth 2 (two) times daily as needed.    atorvastatin (LIPITOR) 40 MG tablet Take 40 mg by mouth once daily.    cetirizine (ZYRTEC) 10 MG tablet Take 10 mg by mouth once daily.    clopidogrel (PLAVIX) 75 mg tablet Take 75 mg by mouth once daily.    ibuprofen (ADVIL,MOTRIN) 200 MG tablet Take 200 mg by mouth every 6 (six) hours as needed for Pain.    ketorolac 0.5% (ACULAR) 0.5 % Drop 1 drop 4 (four) times daily.    losartan (COZAAR) 25 MG tablet Take 25 mg by mouth once daily.    meloxicam (MOBIC) 7.5 MG tablet Take 7.5 mg by mouth once daily.    naproxen sodium (ANAPROX) 220 MG tablet Take 220 mg by mouth 2 (two) times daily as needed.    ranitidine (ZANTAC) 150 MG tablet Take 150 mg by mouth 2 (two) times daily as needed for Heartburn.     Family History     None        Tobacco Use    Smoking status:  Never Smoker   Substance and Sexual Activity    Alcohol use: No    Drug use: No    Sexual activity: Not on file     Review of Systems   Constitution: Positive for malaise/fatigue. Negative for decreased appetite, diaphoresis, fever and night sweats.   HENT: Negative for nosebleeds.    Eyes: Negative for blurred vision and double vision.   Cardiovascular: Positive for dyspnea on exertion and leg swelling. Negative for chest pain, claudication, irregular heartbeat, near-syncope, orthopnea, palpitations, paroxysmal nocturnal dyspnea and syncope.   Respiratory: Negative for cough, shortness of breath, sleep disturbances due to breathing, snoring, sputum production and wheezing.    Endocrine: Negative for cold intolerance and polyuria.   Hematologic/Lymphatic: Does not bruise/bleed easily.   Skin: Negative for rash.   Musculoskeletal: Negative for back pain, falls, joint pain, joint swelling and neck pain.   Gastrointestinal: Negative for abdominal pain, heartburn, nausea and vomiting.   Genitourinary: Negative for dysuria, frequency and hematuria.   Neurological: Negative for difficulty with concentration, dizziness, focal weakness, headaches, light-headedness, numbness, seizures and weakness.   Psychiatric/Behavioral: Negative for depression, memory loss and substance abuse. The patient does not have insomnia.    Allergic/Immunologic: Negative for HIV exposure and hives.     Objective:     Vital Signs (Most Recent):  Temp: 97.2 °F (36.2 °C) (12/13/20 1553)  Pulse: (!) 48 (12/13/20 1553)  Resp: 20 (12/13/20 1553)  BP: (!) 168/72 (12/13/20 1553)  SpO2: 100 % (12/13/20 1553) Vital Signs (24h Range):  Temp:  [97.2 °F (36.2 °C)-98.6 °F (37 °C)] 97.2 °F (36.2 °C)  Pulse:  [48-78] 48  Resp:  [17-24] 20  SpO2:  [97 %-100 %] 100 %  BP: (166-239)/() 168/72     Weight: 66.4 kg (146 lb 6.2 oz)  There is no height or weight on file to calculate BMI.    SpO2: 100 %  O2 Device (Oxygen Therapy): nasal  cannula      Intake/Output Summary (Last 24 hours) at 12/13/2020 1703  Last data filed at 12/13/2020 1514  Gross per 24 hour   Intake 50 ml   Output 2000 ml   Net -1950 ml       Lines/Drains/Airways     Drain            Female External Urinary Catheter 12/13/20 1220 less than 1 day          Peripheral Intravenous Line                 Peripheral IV - Single Lumen 12/13/20 1136 20 G Right Antecubital less than 1 day                Physical Exam   Constitutional: She appears well-nourished.   HENT:   Head: Normocephalic.   Eyes: Pupils are equal, round, and reactive to light.   Neck: Normal carotid pulses and no JVD present. Carotid bruit is not present. No thyromegaly present.   Cardiovascular: Normal heart sounds and normal pulses. An irregularly irregular rhythm present.  No extrasystoles are present. Bradycardia present. PMI is not displaced. Exam reveals no gallop and no S3.   No murmur heard.  Pulmonary/Chest: No stridor. No respiratory distress. She has decreased breath sounds in the right lower field and the left lower field.   Abdominal: Soft. Bowel sounds are normal. There is no abdominal tenderness. There is no rebound.   Musculoskeletal: Normal range of motion.   Skin: Skin is intact. No rash noted.       Significant Labs:   ABG: No results for input(s): PH, PCO2, HCO3, POCSATURATED, BE in the last 48 hours., Blood Culture: No results for input(s): LABBLOO in the last 48 hours., BMP:   Recent Labs   Lab 12/13/20  1135         K 4.0      CO2 26   BUN 23   CREATININE 1.7*   CALCIUM 8.7   MG 2.2   , CMP   Recent Labs   Lab 12/13/20  1135      K 4.0      CO2 26      BUN 23   CREATININE 1.7*   CALCIUM 8.7   PROT 7.4   ALBUMIN 2.8*   BILITOT 0.6   ALKPHOS 116   AST 31   ALT 18   ANIONGAP 7*   ESTGFRAFRICA 30*   EGFRNONAA 26*   , CBC   Recent Labs   Lab 12/13/20  1135   WBC 4.83   HGB 11.1*   HCT 37.2      , INR No results for input(s): INR, PROTIME in the last 48  hours., Lipid Panel No results for input(s): CHOL, HDL, LDLCALC, TRIG, CHOLHDL in the last 48 hours. and Troponin   Recent Labs   Lab 12/13/20  1135   TROPONINI 0.034*       Significant Imaging: EKG:

## 2020-12-13 NOTE — ASSESSMENT & PLAN NOTE
Bradycardia. Asymptomatic. Caused by Labetalol given in ER    Avoid BB and CCB  Start Heparin gtt

## 2020-12-13 NOTE — ASSESSMENT & PLAN NOTE
History of diastolic CHF her echocardiogram and probably fibrillation 2018.  Echo and morning  Cardiology consult.  Discussed with Dr. Valdivia.  Continue aggressive diuresis with IV furosemide but monitor renal function closely.  Admit to tele

## 2020-12-13 NOTE — ED NOTES
Pt c/o per EMS is painful lower stomach. Patient keeps motioning to lower abdomen but is unable to explain to staff what is wrong due to communication barrier. Cyndie brought to bedside for Citizen of [place] translation with no success as the  explains the dialect the patient is speaking is not the type of [place]. There is no emergency contact in patients chart. EMS reports patient family at home were no help as they did not speak any English either.    Patient moved to ED room 10, patient assisted onto stretcher and changed into a gown. Patient placed on cardiac monitor, continuous pulse oximetry and automatic blood pressure cuff. Bed placed in low locked position, side rails up x 2, call light is within reach of patient, alarms set and turned on for monitor and pulse ox, will continue to monitor.    Patient identifies self as Dana Wyman      LOC: The patient is awake, alert.  APPEARANCE: Patient resting comfortably and in no acute distress, patient is clean and well groomed, patient's clothing is properly fastened.  SKIN: The skin is warm and dry, color consistent with ethnicity, patient has normal skin turgor and moist mucus membranes, skin intact, no breakdown or bruising noted.  MUSCULOSKELETAL: Patient moving all extremities well, no obvious swelling or deformities noted.  RESPIRATORY: Airway is open and patent, respirations are spontaneous, patient has a normal effort and rate, no accessory muscle use noted.  CARDIAC: Patient has a normal rate and rhythm, 2+ peripheral edema noted to upper and lower bilateral lower extremities, trace edema to bilateral upper extremities, lower abdomen hard and swollen, capillary refill < 3 seconds.  ABDOMEN: Abdomen is swollen, hard to palpation at lower quadrants.   NEUROLOGIC: PERRL, eyes open spontaneously, behavior appropriate to situation, unable to follow commands as patient does not under stand english and the  at bedside is unable to communicate  patients specific [place] dialect, facial expression symmetrical, bilateral hand grasp equal and even, purposeful motor response noted.

## 2020-12-13 NOTE — HPI
89 yo female, consult for CHF and bradycardia  PMH CAD s/p PCI at [facility], HTN chronic afib on Plavix, lung mass stable, CKD. Bangladeshi speaking    Admitted for SOB and leg swelling up to lower abd.  In ER, /119 mmHG and EKG showed Afib HR 79. Had one dose of labetalol IVP.  BNP 2844 Troponin 0.034, HGN 11 and Cr 1.7  CXR pulm edema  EKG Afib  CT chest abd showed anasarca, pleural effusion and lung mass

## 2020-12-13 NOTE — H&P
[facility] Hospital Medicine  History & Physical    Patient Name: Dana Wyman  MRN: [id number]  Admission Date: 12/13/2020  Attending Physician: Bry Jensen DO   Primary Care Provider: Radha Cleary MD         Patient information was obtained from relative(s), past medical records and ER records.     Subjective:     Principal Problem:Acute congestive heart failure    Chief Complaint:   Chief Complaint   Patient presents with    Shortness of Breath     found to have pulse ox of 86 upon EMS arrival, placed on 2 L NC        HPI: 89-year-old Greek female presenting today for acute anasarca and shortness of breath.  She has past medical history significant for diastolic congestive heart failure with last echo available from 2018 at [facility].  She also has history of CAD and persistent atrial fibrillation.  Med reconciliation difficult to complete at this time but wrist per discussion with family member it sounds like she was out of some of her medications.  Her primary care physician is Dr. Tommie Butler and I reviewed his note from July for history as well as speaking to daughter Barbara ([phone])  She was given 1 time dose furosemide of 60 mg IV in the emergency room and diuresed 2 L before arriving on telemetry.  It was noted that she had heart rate of 78 on EKG but currently in the mid to low 40 range.  Consulted with cardiology who will see patient.  Felt that it may be secondary to 1 time dose of labetalol.  Plan is to continue focus on diuresis.  Echocardiogram in the morning.    Past Medical History:   Diagnosis Date    Aortic atherosclerosis     Atrial fibrillation     Carotid stenosis     CKD (chronic kidney disease) stage 3, GFR 30-59 ml/min     Coronary artery disease     h/o stent in LAD and circumflex    Dementia     Hearing impairment     Hypertension     Mass of upper lobe of right lung     chronic/stable. was followed by pulm at [facility] and opted for no  diagnosis/tx due to slow growth/age.       Past Surgical History:   Procedure Laterality Date    HYSTERECTOMY         Review of patient's allergies indicates:  No Known Allergies    No current facility-administered medications on file prior to encounter.      Current Outpatient Medications on File Prior to Encounter   Medication Sig    valsartan-hydrochlorothiazide (DIOVAN-HCT) 320-12.5 mg per tablet Take 1 tablet by mouth once daily.    acetaminophen (TYLENOL) 500 MG tablet Take 500 mg by mouth every 6 (six) hours as needed for Pain.    albuterol sulfate (VENTOLIN INHL) Inhale into the lungs.    amLODIPine (NORVASC) 10 MG tablet Take 10 mg by mouth once daily.    amLODIPine (NORVASC) 5 MG tablet Take 5 mg by mouth once daily.    aspirin-caffeine (LYNDSEY BACK AND BODY) 500-32.5 mg Tab Take by mouth 2 (two) times daily as needed.    atorvastatin (LIPITOR) 40 MG tablet Take 40 mg by mouth once daily.    cetirizine (ZYRTEC) 10 MG tablet Take 10 mg by mouth once daily.    clopidogrel (PLAVIX) 75 mg tablet Take 75 mg by mouth once daily.    ibuprofen (ADVIL,MOTRIN) 200 MG tablet Take 200 mg by mouth every 6 (six) hours as needed for Pain.    ketorolac 0.5% (ACULAR) 0.5 % Drop 1 drop 4 (four) times daily.    losartan (COZAAR) 25 MG tablet Take 25 mg by mouth once daily.    meloxicam (MOBIC) 7.5 MG tablet Take 7.5 mg by mouth once daily.    naproxen sodium (ANAPROX) 220 MG tablet Take 220 mg by mouth 2 (two) times daily as needed.    ranitidine (ZANTAC) 150 MG tablet Take 150 mg by mouth 2 (two) times daily as needed for Heartburn.     Family History     Problem Relation (Age of Onset)    No Known Problems Mother, Father        Tobacco Use    Smoking status: Never Smoker   Substance and Sexual Activity    Alcohol use: No    Drug use: No    Sexual activity: Not Currently     Review of Systems   Unable to perform ROS: Dementia   Cardiovascular: Positive for leg swelling.     Objective:     Vital Signs  (Most Recent):  Temp: 97.2 °F (36.2 °C) (12/13/20 1553)  Pulse: (!) 48 (12/13/20 1553)  Resp: 20 (12/13/20 1553)  BP: (!) 168/72 (12/13/20 1553)  SpO2: 100 % (12/13/20 1553) Vital Signs (24h Range):  Temp:  [97.2 °F (36.2 °C)-98.6 °F (37 °C)] 97.2 °F (36.2 °C)  Pulse:  [48-78] 48  Resp:  [17-24] 20  SpO2:  [97 %-100 %] 100 %  BP: (166-239)/() 168/72     Weight: 66.4 kg (146 lb 6.2 oz)  There is no height or weight on file to calculate BMI.    Physical Exam  Constitutional:       General: She is not in acute distress.     Appearance: She is well-developed. She is not diaphoretic.   HENT:      Head: Normocephalic and atraumatic.      Nose: Nose normal.   Eyes:      General:         Right eye: No discharge.         Left eye: No discharge.      Conjunctiva/sclera: Conjunctivae normal.      Pupils: Pupils are equal, round, and reactive to light.   Neck:      Thyroid: No thyromegaly.   Cardiovascular:      Rate and Rhythm: Regular rhythm. Bradycardia present.      Pulses: Normal pulses.      Heart sounds: No murmur.   Pulmonary:      Effort: Pulmonary effort is normal. No respiratory distress.      Breath sounds: Rales (bilateral to mid lung fields) present.   Abdominal:      General: There is no distension.      Palpations: Abdomen is soft.   Musculoskeletal:         General: Swelling (pitting edema to lower abdomen) present.      Right lower leg: Edema (3+ pitting B/L) present.      Left lower leg: Edema present.   Skin:     Findings: No rash.   Neurological:      Mental Status: She is alert and oriented to person, place, and time.   Psychiatric:         Behavior: Behavior normal.      Comments: Pleasantly demented           CRANIAL NERVES     CN III, IV, VI   Pupils are equal, round, and reactive to light.       Significant Labs:   BMP:   Recent Labs   Lab 12/13/20  1135         K 4.0      CO2 26   BUN 23   CREATININE 1.7*   CALCIUM 8.7   MG 2.2     CBC:   Recent Labs   Lab 12/13/20  1135   WBC  4.83   HGB 11.1*   HCT 37.2        CMP:   Recent Labs   Lab 12/13/20  1135      K 4.0      CO2 26      BUN 23   CREATININE 1.7*   CALCIUM 8.7   PROT 7.4   ALBUMIN 2.8*   BILITOT 0.6   ALKPHOS 116   AST 31   ALT 18   ANIONGAP 7*   EGFRNONAA 26*     Cardiac Markers:   Recent Labs   Lab 12/13/20  1308   BNP 2,844*     Coagulation: No results for input(s): PT, INR, APTT in the last 48 hours.  Magnesium:   Recent Labs   Lab 12/13/20  1135   MG 2.2       Significant Imaging: I have reviewed and interpreted all pertinent imaging results/findings within the past 24 hours.    Assessment/Plan:     * Acute congestive heart failure  History of diastolic CHF her echocardiogram and probably fibrillation 2018.  Echo and morning  Cardiology consult.  Discussed with Dr. Valdivia.  Continue aggressive diuresis with IV furosemide but monitor renal function closely.  Admit to tele      Persistent atrial fibrillation  Will restart on eliquis 2.5      Mass of upper lobe of right lung  As above      Essential hypertension  Elevated. Monitor at this time. Diuresis will likely help      CKD (chronic kidney disease), stage III  Decreased GFR. Monitor closely with diuresis      Bilateral hearing loss  Complicating communication even in [place]an with       Bilateral carotid artery stenosis  Unclear whether she has been on these. Will resume       Alzheimer's dementia with behavioral disturbance  Complicates communication. She speaks [place] but daughter reports that she doesn't always comprehend      VTE Risk Mitigation (From admission, onward)         Ordered     IP VTE HIGH RISK PATIENT  Once      12/13/20 1543     Place sequential compression device  Until discontinued      12/13/20 1543                   Bry Jensen DO  Department of Hospital Medicine   [facility]

## 2020-12-13 NOTE — ED PROVIDER NOTES
SCRIBE #1 NOTE: I, Berenice Hammond, am scribing for, and in the presence of, Cayetano Calle Jr., MD. I have scribed the entire note.       History     Chief Complaint   Patient presents with    Shortness of Breath     found to have pulse ox of 86 upon EMS arrival, placed on 2 L NC     Review of patient's allergies indicates:  No Known Allergies      History of Present Illness     HPI    12/13/2020, 11:38 AM  History limited secondary to language barrier.         History of Present Illness: Dana Wyman is a 89 y.o. female patient with a PMHx of HTN who presents to the Emergency Department for evaluation of bilateral leg swelling and swelling to abdominal wall. Per [facility], patient's pulse ox was 86% on arrival and she was placed on 2 L nc. History was unable to be obtained. Per [facility] , patient speaks a dialect of [place] that they do not have an  for.       Arrival mode: [facility]    PCP: Radha Cleary MD        Past Medical History:  Past Medical History:   Diagnosis Date    Hypertension        Past Surgical History:  No past surgical history on file.      Family History:  No family history on file.    Social History:  Social History     Tobacco Use    Smoking status: Never Smoker   Substance and Sexual Activity    Alcohol use: No    Drug use: No    Sexual activity: Not on file        Review of Systems     Review of Systems   Unable to perform ROS: Other   Language barrier.     Physical Exam     Initial Vitals [12/13/20 1108]   BP Pulse Resp Temp SpO2   (!) 170/90 68 (!) 24 98.5 °F (36.9 °C) 97 %      MAP       --          Physical Exam  Nursing Notes and Vital Signs Reviewed.  Constitutional: Patient is in no acute distress. Well-developed and well-nourished.  Head: Atraumatic. Normocephalic.  Eyes: PERRL. EOM intact. Conjunctivae are not pale. No scleral icterus.  ENT: Mucous membranes are moist. Oropharynx is clear and symmetric.    Neck: Supple. Full ROM. No  lymphadenopathy.  Cardiovascular: Regular rate. Regular rhythm. No murmurs, rubs, or gallops. Distal pulses are 2+ and symmetric.  Pulmonary/Chest: No respiratory distress. Clear to auscultation bilaterally. No wheezing or rales.  Abdominal: Soft and non-distended.  There is no tenderness.  No rebound, guarding, or rigidity. Good bowel sounds.  Genitourinary: No CVA tenderness  Musculoskeletal: Moves all extremities. No obvious deformities. There is diffuse edema from the  Ft to the upper abdominal wall.  Skin: Warm and dry.  Neurological:  Alert, awake, and appropriate.  Normal speech.  No acute focal neurological deficits are appreciated.  Psychiatric: Normal affect. Good eye contact. Appropriate in content.       ED Course   Critical Care    Date/Time: 12/13/2020 2:11 PM  Performed by: Cayetano Calle Jr., MD  Authorized by: Cayetano Calle Jr., MD   Direct patient critical care time: 11 minutes  Additional history critical care time: 0 minutes  Ordering / reviewing critical care time: 9 minutes  Documentation critical care time: 5 minutes  Consulting other physicians critical care time: 5 minutes  Consult with family critical care time: 0 minutes  Other critical care time: 0 minutes  Total critical care time (exclusive of procedural time) : 30 minutes  Critical care time was exclusive of teaching time and separately billable procedures and treating other patients.  Critical care was necessary to treat or prevent imminent or life-threatening deterioration of the following conditions: acute CHF, Afib, pneumonia, elevated troponin, hypertensive emergency.  Critical care was time spent personally by me on the following activities: blood draw for specimens, development of treatment plan with patient or surrogate, discussions with consultants, interpretation of cardiac output measurements, evaluation of patient's response to treatment, examination of patient, obtaining history from patient or surrogate, ordering and  performing treatments and interventions, ordering and review of laboratory studies, ordering and review of radiographic studies, pulse oximetry, re-evaluation of patient's condition and review of old charts.        ED Vital Signs:  Vitals:    12/13/20 1108 12/13/20 1116 12/13/20 1134 12/13/20 1330   BP: (!) 170/90   (!) 239/107   Pulse: 68  62 78   Resp: (!) 24   17   Temp: 98.5 °F (36.9 °C)      TempSrc: Oral      SpO2: 97%   100%   Weight:  69.8 kg (153 lb 14.4 oz)      12/13/20 1335   BP: (!) (P) 222/97   Pulse: 66   Resp: 17   Temp:    TempSrc:    SpO2: 100%   Weight:        Abnormal Lab Results:  Labs Reviewed   CBC W/ AUTO DIFFERENTIAL - Abnormal; Notable for the following components:       Result Value    Hemoglobin 11.1 (*)     MCHC 29.8 (*)     RDW 18.0 (*)     All other components within normal limits   COMPREHENSIVE METABOLIC PANEL - Abnormal; Notable for the following components:    Creatinine 1.7 (*)     Albumin 2.8 (*)     Anion Gap 7 (*)     eGFR if  30 (*)     eGFR if non  26 (*)     All other components within normal limits   URINALYSIS, REFLEX TO URINE CULTURE - Abnormal; Notable for the following components:    Protein, UA 1+ (*)     Occult Blood UA 2+ (*)     All other components within normal limits    Narrative:     Specimen Source->Urine   B-TYPE NATRIURETIC PEPTIDE - Abnormal; Notable for the following components:    BNP 2,844 (*)     All other components within normal limits   TROPONIN I - Abnormal; Notable for the following components:    Troponin I 0.034 (*)     All other components within normal limits   URINALYSIS MICROSCOPIC - Abnormal; Notable for the following components:    RBC, UA 5 (*)     All other components within normal limits    Narrative:     Specimen Source->Urine   CULTURE, BLOOD   CULTURE, BLOOD   LACTIC ACID, PLASMA   MAGNESIUM   PHOSPHORUS   SARS-COV-2 RNA AMPLIFICATION, QUAL   B-TYPE NATRIURETIC PEPTIDE   TROPONIN I   LACTIC ACID, PLASMA         All Lab Results:  Results for orders placed or performed during the hospital encounter of 12/13/20   CBC auto differential   Result Value Ref Range    WBC 4.83 3.90 - 12.70 K/uL    RBC 4.10 4.00 - 5.40 M/uL    Hemoglobin 11.1 (L) 12.0 - 16.0 g/dL    Hematocrit 37.2 37.0 - 48.5 %    MCV 91 82 - 98 fL    MCH 27.1 27.0 - 31.0 pg    MCHC 29.8 (L) 32.0 - 36.0 g/dL    RDW 18.0 (H) 11.5 - 14.5 %    Platelets 155 150 - 350 K/uL    MPV SEE COMMENT 9.2 - 12.9 fL    Immature Granulocytes 0.2 0.0 - 0.5 %    Gran # (ANC) 2.8 1.8 - 7.7 K/uL    Immature Grans (Abs) 0.01 0.00 - 0.04 K/uL    Lymph # 1.3 1.0 - 4.8 K/uL    Mono # 0.4 0.3 - 1.0 K/uL    Eos # 0.3 0.0 - 0.5 K/uL    Baso # 0.04 0.00 - 0.20 K/uL    nRBC 0 0 /100 WBC    Gran % 58.8 38.0 - 73.0 %    Lymph % 26.9 18.0 - 48.0 %    Mono % 8.1 4.0 - 15.0 %    Eosinophil % 5.2 0.0 - 8.0 %    Basophil % 0.8 0.0 - 1.9 %    Differential Method Automated    Comprehensive metabolic panel   Result Value Ref Range    Sodium 140 136 - 145 mmol/L    Potassium 4.0 3.5 - 5.1 mmol/L    Chloride 107 95 - 110 mmol/L    CO2 26 23 - 29 mmol/L    Glucose 109 70 - 110 mg/dL    BUN 23 8 - 23 mg/dL    Creatinine 1.7 (H) 0.5 - 1.4 mg/dL    Calcium 8.7 8.7 - 10.5 mg/dL    Total Protein 7.4 6.0 - 8.4 g/dL    Albumin 2.8 (L) 3.5 - 5.2 g/dL    Total Bilirubin 0.6 0.1 - 1.0 mg/dL    Alkaline Phosphatase 116 55 - 135 U/L    AST 31 10 - 40 U/L    ALT 18 10 - 44 U/L    Anion Gap 7 (L) 8 - 16 mmol/L    eGFR if African American 30 (A) >60 mL/min/1.73 m^2    eGFR if non African American 26 (A) >60 mL/min/1.73 m^2   Lactic Acid Plasma #1   Result Value Ref Range    Lactate (Lactic Acid) 1.7 0.5 - 2.2 mmol/L   Magnesium   Result Value Ref Range    Magnesium 2.2 1.6 - 2.6 mg/dL   Phosphorus   Result Value Ref Range    Phosphorus 2.9 2.7 - 4.5 mg/dL   Urinalysis, Reflex to Urine Culture Urine, Clean Catch    Specimen: Urine   Result Value Ref Range    Specimen UA Urine, Clean Catch     Color, UA Yellow  Yellow, Straw, Melany    Appearance, UA Clear Clear    pH, UA 7.0 5.0 - 8.0    Specific Gravity, UA 1.015 1.005 - 1.030    Protein, UA 1+ (A) Negative    Glucose, UA Negative Negative    Ketones, UA Negative Negative    Bilirubin (UA) Negative Negative    Occult Blood UA 2+ (A) Negative    Nitrite, UA Negative Negative    Urobilinogen, UA Negative <2.0 EU/dL    Leukocytes, UA Negative Negative   COVID-19 Rapid Screening   Result Value Ref Range    SARS-CoV-2 RNA, Amplification, Qual Negative Negative   BNP   Result Value Ref Range    BNP 2,844 (H) 0 - 99 pg/mL   Troponin I   Result Value Ref Range    Troponin I 0.034 (H) 0.000 - 0.026 ng/mL   Urinalysis Microscopic   Result Value Ref Range    RBC, UA 5 (H) 0 - 4 /hpf    WBC, UA 1 0 - 5 /hpf    Bacteria None None-Occ /hpf    Hyaline Casts, UA 0 0-1/lpf /lpf    Microscopic Comment SEE COMMENT        Imaging Results:  Imaging Results          CT Chest Abdomen Pelvis Without Contrast (XPD) (Final result)  Result time 12/13/20 13:24:53   Procedure changed from CT Abdomen Pelvis  Without Contrast     Final result by Alex Mckeon MD (12/13/20 13:24:53)                 Impression:      1. Anasarca with generalized marked body wall edema chest, abdomen, and pelvis.  Bilateral dependent pleural effusions, moderate on the right and small on the left.  Small volume ascites.  2. Moderate cardiomegaly.  Extensive atherosclerosis chest, abdomen, and pelvis.  3. Right upper lobe lung mass, 4.2 cm, with associated thin pleuroparenchymal bands and adjacent patchy ground-glass opacities.  4. Mosaic perfusion pattern alternating geographic increased and decreased density bilateral upper and lower lobe distribution, particularly lower lobes with associated septal thickening, probable pulmonary edema sequela superimposed upon small airway process.  5. Multilevel advanced degenerative thoracolumbar spine.  Chronic marked T11 compression fracture deformity.  Mild compression fracture  versus Schmorl's node superior endplate L1.  All CT scans at this facility are performed  using dose modulation techniques as appropriate to performed exam including the following:  automated exposure control; adjustment of mA and/or kV according to the patients size (this includes techniques or standardized protocols for targeted exams where dose is matched to indication/reason for exam: i.e. extremities or head);  iterative reconstruction technique.      Electronically signed by: Alex Mckeon MD  Date:    12/13/2020  Time:    13:24             Narrative:    EXAMINATION:  CT CHEST ABDOMEN PELVIS WITHOUT CONTRAST(XPD)    CLINICAL HISTORY:  Abdominal distension;Abdominal pain, acute, nonlocalized;anasarca;    TECHNIQUE:  Chest, abdomen, and pelvic CT performed without contrast.  Coronal and sagittal reformations.    COMPARISON:  Chest x-ray 12/13/2020.    FINDINGS:  Chest CT.  There is anasarca with generalized marked body wall edema.    There are dots of air associated with the right internal jugular/subclavian vein junction and axillary vein likely related to peripheral IV access.    Moderate cardiomegaly.  Extensive coronary artery and aortic atherosclerosis.  Normal caliber aorta.  Pulmonary arteries are enlarged.    Enlarged subcarinal lymph node, 3.8 x 2.3 cm.  AP window lymph node 2.3 x 0.9 cm.  No hilar adenopathy.    Trachea and central bronchi are patent.    Bilateral dependent pleural effusions, moderate on the right and small on the left.  There is a 4.2 x 3.6 x 2.2 cm right upper lobe lung mass with associated thin pleuroparenchymal bands and adjacent patchy ground-glass opacities.  There is a mosaic perfusion pattern    with geographic alternating areas of increased and decreased density in bilateral upper and lower lobe distribution, particularly involving the lower lobes with associated septal thickening, probable pulmonary edema sequela superimposed upon small airway process.    Degenerative thoracic  spine.  Chronic appearing T11 compression fracture deformity with a prominent anterior wedging, 57 5% loss of height.  No retropulsion.    Abdominal CT.  The there is anasarca with generalized marked body wall edema.    Small volume ascites, perihepatic, perisplenic, and lower pericolic gutter/pelvic cul-de-sac distribution.    Noncontrast evaluation liver, spleen, pancreas, adrenal glands, and kidneys is unremarkable.  Bilateral pelvicaliectasis.  No mary hydronephrosis.    Normal caliber tortuous aorta.  There is extensive atherosclerosis.  No adenopathy.  Gallbladder is contracted.  No bile duct dilatation.    No bowel obstruction.  Normal appendix.  The GI tract is otherwise unremarkable.    Multilevel severe lumbar degenerative disc disease sequela.  Superior endplate Schmorl's node versus mild compression fracture L1.    Pelvic CT.  There is anasarca with generalized marked body wall edema.  The pelvic ring is intact.  Small pelvic ascites.  Extensive atherosclerosis.  Pelvic bowel loops are unremarkable.  Ureters and urinary bladder are normal.  Uterus and ovaries are not identified.                               X-Ray Chest 1 View (Final result)  Result time 12/13/20 11:50:40    Final result by Alex Mckeon MD (12/13/20 11:50:40)                 Impression:      Abnormal chest x-ray.  See discussion above.      Electronically signed by: Alex Mckeon MD  Date:    12/13/2020  Time:    11:50             Narrative:    EXAMINATION:  XR CHEST 1 VIEW    CLINICAL HISTORY:  Sepsis;    FINDINGS:  Comparison study 03/05/2018.  The heart is enlarged.  Tortuous atherosclerotic aorta.  The pulmonary vasculature appears congested.  There are bilateral pleural effusions blunting the costophrenic angles, new since prior study.  There is a 4 cm opacity right upper lobe, similar to although more pronounced compared to the prior examination, chronic infiltrate versus lung mass.  Recommend chest CT to further evaluate.                                  The EKG was ordered, reviewed, and independently interpreted by the ED provider.  Interpretation time: 11:25  Rate: 78 BPM  Rhythm: AFib wtih a competing junctional pacemaker  Interpretation: Rightward axis. Nonspecific T wave abnormality. No STEMI.           The Emergency Provider reviewed the vital signs and test results, which are outlined above.     ED Discussion     2:22 PM: Discussed case with HAROON Boles ([facility]). Dr. Jensen agrees with current care and management of pt and accepts admission.   Admitting Service: \[facility]\"" medicine  Admitting Physician: Dr. Jensen  Admit to: obs tele    2:23 PM: Re-evaluated pt. Pt is ready for admit.     2:29 PM    Patient is stable.  Blood pressures improved.  The patient speaks and unknown dialect of via the [employer] that is unable to be translated by Bruno.  Patient's contact information is for the hospital [facility] all places.  We have no ability to translate however on physical exam the patient has gross anasarca.  We have reviewed old records.  Patient has a history of chronic Afib, right upper lobe lung mass as well as CHF.  Patient's workup shows what may be a postobstructive pneumonia as well as an elevated BNP and troponin consistent with decompensated CHF.  Will admit for diuresis and further evaluation and workup.    Medical Decision Making:   Clinical Tests:   Lab Tests: Ordered and Reviewed  Radiological Study: Ordered and Reviewed  Medical Tests: Ordered and Reviewed           ED Medication(s):  Medications   cefTRIAXone (ROCEPHIN) 1 g/50 mL D5W IVPB (1 g Intravenous New Bag 12/13/20 1400)   doxycycline tablet 100 mg (has no administration in time range)   furosemide injection 60 mg (60 mg Intravenous Given 12/13/20 1208)   labetaloL injection 20 mg (20 mg Intravenous Given 12/13/20 1355)   aspirin tablet 325 mg (325 mg Oral Given 12/13/20 1355)   nitroGLYCERIN 2% TD oint ointment 1 inch (1 inch Topical (Top) Given  12/13/20 5463)       Scribe Attestation:   Scribe #1: I performed the above scribed service and the documentation accurately describes the services I performed. I attest to the accuracy of the note.     Attending:   Physician Attestation Statement for Scribe #1: I, Cayetano Calle Jr., MD, personally performed the services described in this documentation, as scribed by Berenice Hammond, in my presence, and it is both accurate and complete.           Clinical Impression       ICD-10-CM ICD-9-CM   1. Acute congestive heart failure, unspecified heart failure type  I50.9 428.0   2. Edema  R60.9 782.3   3. Atrial fibrillation, unspecified type  I48.91 427.31   4. Mass of upper lobe of right lung  R91.8 786.6   5. Pneumonia of right upper lobe due to infectious organism  J18.9 486   6. Troponin level elevated  R77.8 790.6   7. Hypertensive emergency  I16.1 401.9       Disposition:   Disposition: Placed in Observation  Condition: Fair         Cayetano Calle Jr., MD  12/13/20 2927

## 2020-12-13 NOTE — SUBJECTIVE & OBJECTIVE
Past Medical History:   Diagnosis Date    Aortic atherosclerosis     Atrial fibrillation     Carotid stenosis     CKD (chronic kidney disease) stage 3, GFR 30-59 ml/min     Coronary artery disease     h/o stent in LAD and circumflex    Dementia     Hearing impairment     Hypertension     Mass of upper lobe of right lung     chronic/stable. was followed by pulm at [facility] and opted for no diagnosis/tx due to slow growth/age.       Past Surgical History:   Procedure Laterality Date    HYSTERECTOMY         Review of patient's allergies indicates:  No Known Allergies    No current facility-administered medications on file prior to encounter.      Current Outpatient Medications on File Prior to Encounter   Medication Sig    valsartan-hydrochlorothiazide (DIOVAN-HCT) 320-12.5 mg per tablet Take 1 tablet by mouth once daily.    acetaminophen (TYLENOL) 500 MG tablet Take 500 mg by mouth every 6 (six) hours as needed for Pain.    albuterol sulfate (VENTOLIN INHL) Inhale into the lungs.    amLODIPine (NORVASC) 10 MG tablet Take 10 mg by mouth once daily.    amLODIPine (NORVASC) 5 MG tablet Take 5 mg by mouth once daily.    aspirin-caffeine (LYNDSEY BACK AND BODY) 500-32.5 mg Tab Take by mouth 2 (two) times daily as needed.    atorvastatin (LIPITOR) 40 MG tablet Take 40 mg by mouth once daily.    cetirizine (ZYRTEC) 10 MG tablet Take 10 mg by mouth once daily.    clopidogrel (PLAVIX) 75 mg tablet Take 75 mg by mouth once daily.    ibuprofen (ADVIL,MOTRIN) 200 MG tablet Take 200 mg by mouth every 6 (six) hours as needed for Pain.    ketorolac 0.5% (ACULAR) 0.5 % Drop 1 drop 4 (four) times daily.    losartan (COZAAR) 25 MG tablet Take 25 mg by mouth once daily.    meloxicam (MOBIC) 7.5 MG tablet Take 7.5 mg by mouth once daily.    naproxen sodium (ANAPROX) 220 MG tablet Take 220 mg by mouth 2 (two) times daily as needed.    ranitidine (ZANTAC) 150 MG tablet Take 150 mg by mouth 2 (two) times daily as needed  for Heartburn.     Family History     Problem Relation (Age of Onset)    No Known Problems Mother, Father        Tobacco Use    Smoking status: Never Smoker   Substance and Sexual Activity    Alcohol use: No    Drug use: No    Sexual activity: Not Currently     Review of Systems   Unable to perform ROS: Dementia   Cardiovascular: Positive for leg swelling.     Objective:     Vital Signs (Most Recent):  Temp: 97.2 °F (36.2 °C) (12/13/20 1553)  Pulse: (!) 48 (12/13/20 1553)  Resp: 20 (12/13/20 1553)  BP: (!) 168/72 (12/13/20 1553)  SpO2: 100 % (12/13/20 1553) Vital Signs (24h Range):  Temp:  [97.2 °F (36.2 °C)-98.6 °F (37 °C)] 97.2 °F (36.2 °C)  Pulse:  [48-78] 48  Resp:  [17-24] 20  SpO2:  [97 %-100 %] 100 %  BP: (166-239)/() 168/72     Weight: 66.4 kg (146 lb 6.2 oz)  There is no height or weight on file to calculate BMI.    Physical Exam  Constitutional:       General: She is not in acute distress.     Appearance: She is well-developed. She is not diaphoretic.   HENT:      Head: Normocephalic and atraumatic.      Nose: Nose normal.   Eyes:      General:         Right eye: No discharge.         Left eye: No discharge.      Conjunctiva/sclera: Conjunctivae normal.      Pupils: Pupils are equal, round, and reactive to light.   Neck:      Thyroid: No thyromegaly.   Cardiovascular:      Rate and Rhythm: Regular rhythm. Bradycardia present.      Pulses: Normal pulses.      Heart sounds: No murmur.   Pulmonary:      Effort: Pulmonary effort is normal. No respiratory distress.      Breath sounds: Rales (bilateral to mid lung fields) present.   Abdominal:      General: There is no distension.      Palpations: Abdomen is soft.   Musculoskeletal:         General: Swelling (pitting edema to lower abdomen) present.      Right lower leg: Edema (3+ pitting B/L) present.      Left lower leg: Edema present.   Skin:     Findings: No rash.   Neurological:      Mental Status: She is alert and oriented to person, place, and  time.   Psychiatric:         Behavior: Behavior normal.      Comments: Pleasantly demented           CRANIAL NERVES     CN III, IV, VI   Pupils are equal, round, and reactive to light.       Significant Labs:   BMP:   Recent Labs   Lab 12/13/20  1135         K 4.0      CO2 26   BUN 23   CREATININE 1.7*   CALCIUM 8.7   MG 2.2     CBC:   Recent Labs   Lab 12/13/20  1135   WBC 4.83   HGB 11.1*   HCT 37.2        CMP:   Recent Labs   Lab 12/13/20  1135      K 4.0      CO2 26      BUN 23   CREATININE 1.7*   CALCIUM 8.7   PROT 7.4   ALBUMIN 2.8*   BILITOT 0.6   ALKPHOS 116   AST 31   ALT 18   ANIONGAP 7*   EGFRNONAA 26*     Cardiac Markers:   Recent Labs   Lab 12/13/20  1308   BNP 2,844*     Coagulation: No results for input(s): PT, INR, APTT in the last 48 hours.  Magnesium:   Recent Labs   Lab 12/13/20  1135   MG 2.2       Significant Imaging: I have reviewed and interpreted all pertinent imaging results/findings within the past 24 hours.

## 2020-12-13 NOTE — HPI
89-year-old [place] female presenting today for acute anasarca and shortness of breath.  She has past medical history significant for diastolic congestive heart failure with last echo available from 2018 at [facility].  She also has history of CAD and persistent atrial fibrillation.  Med reconciliation difficult to complete at this time but wrist per discussion with family member it sounds like she was out of some of her medications.  Her primary care physician is Dr. Tommie Butler and I reviewed his note from July for history as well as speaking to daughter Barbara ([phone])  She was given 1 time dose furosemide of 60 mg IV in the emergency room and diuresed 2 L before arriving on telemetry.  It was noted that she had heart rate of 78 on EKG but currently in the mid to low 40 range.  Consulted with cardiology who will see patient.  Felt that it may be secondary to 1 time dose of labetalol.  Plan is to continue focus on diuresis.  Echocardiogram in the morning.

## 2020-12-14 PROBLEM — I50.41 ACUTE COMBINED SYSTOLIC AND DIASTOLIC CONGESTIVE HEART FAILURE: Status: ACTIVE | Noted: 2020-12-13

## 2020-12-14 LAB
ALBUMIN SERPL BCP-MCNC: 2.3 G/DL (ref 3.5–5.2)
ALP SERPL-CCNC: 102 U/L (ref 55–135)
ALT SERPL W/O P-5'-P-CCNC: 13 U/L (ref 10–44)
ANION GAP SERPL CALC-SCNC: 8 MMOL/L (ref 8–16)
APTT BLDCRRT: 56.7 SEC (ref 21–32)
APTT BLDCRRT: 86.3 SEC (ref 21–32)
ASCENDING AORTA: 3.14 CM
AST SERPL-CCNC: 25 U/L (ref 10–40)
AV INDEX (PROSTH): 0.45
AV MEAN GRADIENT: 6 MMHG
AV PEAK GRADIENT: 12 MMHG
AV VALVE AREA: 1.36 CM2
AV VELOCITY RATIO: 0.45
BASOPHILS # BLD AUTO: 0.02 K/UL (ref 0–0.2)
BASOPHILS NFR BLD: 0.5 % (ref 0–1.9)
BILIRUB SERPL-MCNC: 0.5 MG/DL (ref 0.1–1)
BSA FOR ECHO PROCEDURE: 1.64 M2
BUN SERPL-MCNC: 19 MG/DL (ref 8–23)
CALCIUM SERPL-MCNC: 8.6 MG/DL (ref 8.7–10.5)
CHLORIDE SERPL-SCNC: 104 MMOL/L (ref 95–110)
CO2 SERPL-SCNC: 31 MMOL/L (ref 23–29)
CREAT SERPL-MCNC: 1.6 MG/DL (ref 0.5–1.4)
CV ECHO LV RWT: 0.44 CM
DIFFERENTIAL METHOD: ABNORMAL
DOP CALC AO PEAK VEL: 1.72 M/S
DOP CALC AO VTI: 41.04 CM
DOP CALC LVOT AREA: 3 CM2
DOP CALC LVOT DIAMETER: 1.96 CM
DOP CALC LVOT PEAK VEL: 0.78 M/S
DOP CALC LVOT STROKE VOLUME: 55.64 CM3
DOP CALCLVOT PEAK VEL VTI: 18.45 CM
E WAVE DECELERATION TIME: 201.3 MSEC
E/A RATIO: 3.93
ECHO LV POSTERIOR WALL: 0.99 CM (ref 0.6–1.1)
EOSINOPHIL # BLD AUTO: 0.4 K/UL (ref 0–0.5)
EOSINOPHIL NFR BLD: 10.6 % (ref 0–8)
ERYTHROCYTE [DISTWIDTH] IN BLOOD BY AUTOMATED COUNT: 17.8 % (ref 11.5–14.5)
EST. GFR  (AFRICAN AMERICAN): 33 ML/MIN/1.73 M^2
EST. GFR  (NON AFRICAN AMERICAN): 28 ML/MIN/1.73 M^2
FRACTIONAL SHORTENING: 27 % (ref 28–44)
GLUCOSE SERPL-MCNC: 88 MG/DL (ref 70–110)
HCT VFR BLD AUTO: 35.3 % (ref 37–48.5)
HGB BLD-MCNC: 10.6 G/DL (ref 12–16)
IMM GRANULOCYTES # BLD AUTO: 0.01 K/UL (ref 0–0.04)
IMM GRANULOCYTES NFR BLD AUTO: 0.3 % (ref 0–0.5)
INTERVENTRICULAR SEPTUM: 1 CM (ref 0.6–1.1)
IVRT: 138.41 MSEC
LA MAJOR: 5.45 CM
LA MINOR: 6.08 CM
LA WIDTH: 4.75 CM
LEFT ATRIUM SIZE: 5.03 CM
LEFT ATRIUM VOLUME INDEX: 73.2 ML/M2
LEFT ATRIUM VOLUME: 116.73 CM3
LEFT INTERNAL DIMENSION IN SYSTOLE: 3.29 CM (ref 2.1–4)
LEFT VENTRICLE DIASTOLIC VOLUME INDEX: 58.17 ML/M2
LEFT VENTRICLE DIASTOLIC VOLUME: 92.74 ML
LEFT VENTRICLE MASS INDEX: 96 G/M2
LEFT VENTRICLE SYSTOLIC VOLUME INDEX: 27.5 ML/M2
LEFT VENTRICLE SYSTOLIC VOLUME: 43.79 ML
LEFT VENTRICULAR INTERNAL DIMENSION IN DIASTOLE: 4.51 CM (ref 3.5–6)
LEFT VENTRICULAR MASS: 152.77 G
LYMPHOCYTES # BLD AUTO: 1.1 K/UL (ref 1–4.8)
LYMPHOCYTES NFR BLD: 30.1 % (ref 18–48)
MAGNESIUM SERPL-MCNC: 2 MG/DL (ref 1.6–2.6)
MCH RBC QN AUTO: 27.1 PG (ref 27–31)
MCHC RBC AUTO-ENTMCNC: 30 G/DL (ref 32–36)
MCV RBC AUTO: 90 FL (ref 82–98)
MONOCYTES # BLD AUTO: 0.4 K/UL (ref 0.3–1)
MONOCYTES NFR BLD: 9.6 % (ref 4–15)
MV PEAK A VEL: 0.28 M/S
MV PEAK E VEL: 1.1 M/S
NEUTROPHILS # BLD AUTO: 1.8 K/UL (ref 1.8–7.7)
NEUTROPHILS NFR BLD: 48.9 % (ref 38–73)
NRBC BLD-RTO: 0 /100 WBC
PISA MRMAX VEL: 0.06 M/S
PISA TR MAX VEL: 3.16 M/S
PLATELET # BLD AUTO: 142 K/UL (ref 150–350)
PMV BLD AUTO: ABNORMAL FL (ref 9.2–12.9)
POTASSIUM SERPL-SCNC: 4.1 MMOL/L (ref 3.5–5.1)
PROT SERPL-MCNC: 6.6 G/DL (ref 6–8.4)
PV PEAK VELOCITY: 1.14 CM/S
RA MAJOR: 6.27 CM
RA PRESSURE: 3 MMHG
RA WIDTH: 3.85 CM
RBC # BLD AUTO: 3.91 M/UL (ref 4–5.4)
SINUS: 3.04 CM
SODIUM SERPL-SCNC: 143 MMOL/L (ref 136–145)
STJ: 2.23 CM
TR MAX PG: 40 MMHG
TRICUSPID ANNULAR PLANE SYSTOLIC EXCURSION: 0.96 CM
TV REST PULMONARY ARTERY PRESSURE: 43 MMHG
WBC # BLD AUTO: 3.76 K/UL (ref 3.9–12.7)

## 2020-12-14 PROCEDURE — 27000221 HC OXYGEN, UP TO 24 HOURS

## 2020-12-14 PROCEDURE — 93010 EKG 12-LEAD: ICD-10-PCS | Mod: ,,, | Performed by: INTERNAL MEDICINE

## 2020-12-14 PROCEDURE — 96366 THER/PROPH/DIAG IV INF ADDON: CPT

## 2020-12-14 PROCEDURE — 36415 COLL VENOUS BLD VENIPUNCTURE: CPT

## 2020-12-14 PROCEDURE — 93010 ELECTROCARDIOGRAM REPORT: CPT | Mod: ,,, | Performed by: INTERNAL MEDICINE

## 2020-12-14 PROCEDURE — 94761 N-INVAS EAR/PLS OXIMETRY MLT: CPT

## 2020-12-14 PROCEDURE — 93005 ELECTROCARDIOGRAM TRACING: CPT

## 2020-12-14 PROCEDURE — 21400001 HC TELEMETRY ROOM

## 2020-12-14 PROCEDURE — 25000003 PHARM REV CODE 250: Performed by: FAMILY MEDICINE

## 2020-12-14 PROCEDURE — 85730 THROMBOPLASTIN TIME PARTIAL: CPT | Mod: 91

## 2020-12-14 PROCEDURE — 96376 TX/PRO/DX INJ SAME DRUG ADON: CPT

## 2020-12-14 PROCEDURE — 99233 SBSQ HOSP IP/OBS HIGH 50: CPT | Mod: 25,,, | Performed by: INTERNAL MEDICINE

## 2020-12-14 PROCEDURE — 25000003 PHARM REV CODE 250: Performed by: NURSE PRACTITIONER

## 2020-12-14 PROCEDURE — 99233 PR SUBSEQUENT HOSPITAL CARE,LEVL III: ICD-10-PCS | Mod: ,,, | Performed by: FAMILY MEDICINE

## 2020-12-14 PROCEDURE — 96365 THER/PROPH/DIAG IV INF INIT: CPT

## 2020-12-14 PROCEDURE — 99233 SBSQ HOSP IP/OBS HIGH 50: CPT | Mod: ,,, | Performed by: FAMILY MEDICINE

## 2020-12-14 PROCEDURE — 80053 COMPREHEN METABOLIC PANEL: CPT

## 2020-12-14 PROCEDURE — 99233 PR SUBSEQUENT HOSPITAL CARE,LEVL III: ICD-10-PCS | Mod: 25,,, | Performed by: INTERNAL MEDICINE

## 2020-12-14 PROCEDURE — 85025 COMPLETE CBC W/AUTO DIFF WBC: CPT

## 2020-12-14 PROCEDURE — 63600175 PHARM REV CODE 636 W HCPCS: Performed by: FAMILY MEDICINE

## 2020-12-14 PROCEDURE — 83735 ASSAY OF MAGNESIUM: CPT

## 2020-12-14 RX ORDER — LOSARTAN POTASSIUM 25 MG/1
25 TABLET ORAL DAILY
Status: ON HOLD | COMMUNITY
Start: 2019-12-26 | End: 2021-01-27 | Stop reason: HOSPADM

## 2020-12-14 RX ORDER — PAROXETINE HYDROCHLORIDE 20 MG/1
1 TABLET, FILM COATED ORAL DAILY
COMMUNITY
Start: 2020-03-30

## 2020-12-14 RX ORDER — OMEPRAZOLE 40 MG/1
40 CAPSULE, DELAYED RELEASE ORAL DAILY
COMMUNITY
Start: 2020-04-16

## 2020-12-14 RX ORDER — HYDRALAZINE HYDROCHLORIDE 25 MG/1
25 TABLET, FILM COATED ORAL EVERY 8 HOURS
Status: DISCONTINUED | OUTPATIENT
Start: 2020-12-14 | End: 2020-12-15

## 2020-12-14 RX ORDER — AMLODIPINE BESYLATE 10 MG/1
1 TABLET ORAL DAILY
Status: ON HOLD | COMMUNITY
Start: 2020-03-25 | End: 2020-12-16 | Stop reason: HOSPADM

## 2020-12-14 RX ORDER — LORAZEPAM 0.5 MG/1
1 TABLET ORAL 2 TIMES DAILY PRN
Status: ON HOLD | COMMUNITY
Start: 2020-03-04 | End: 2020-12-16 | Stop reason: HOSPADM

## 2020-12-14 RX ORDER — ALBUTEROL SULFATE 90 UG/1
2 AEROSOL, METERED RESPIRATORY (INHALATION) EVERY 6 HOURS PRN
COMMUNITY
Start: 2019-12-26

## 2020-12-14 RX ORDER — CLOPIDOGREL BISULFATE 75 MG/1
1 TABLET ORAL DAILY
COMMUNITY
Start: 2020-03-04

## 2020-12-14 RX ORDER — LOSARTAN POTASSIUM 50 MG/1
50 TABLET ORAL DAILY
Status: DISCONTINUED | OUTPATIENT
Start: 2020-12-14 | End: 2020-12-14

## 2020-12-14 RX ORDER — FUROSEMIDE 40 MG/1
40 TABLET ORAL DAILY
Status: ON HOLD | COMMUNITY
Start: 2019-12-26 | End: 2020-12-16 | Stop reason: SDUPTHER

## 2020-12-14 RX ORDER — LOSARTAN POTASSIUM 25 MG/1
25 TABLET ORAL DAILY
Status: DISCONTINUED | OUTPATIENT
Start: 2020-12-15 | End: 2020-12-16 | Stop reason: HOSPADM

## 2020-12-14 RX ORDER — ATORVASTATIN CALCIUM 40 MG/1
1 TABLET, FILM COATED ORAL DAILY
Status: ON HOLD | COMMUNITY
Start: 2020-03-25 | End: 2020-12-14 | Stop reason: SDUPTHER

## 2020-12-14 RX ADMIN — FUROSEMIDE 40 MG: 10 INJECTION, SOLUTION INTRAMUSCULAR; INTRAVENOUS at 04:12

## 2020-12-14 RX ADMIN — LOSARTAN POTASSIUM 50 MG: 50 TABLET, FILM COATED ORAL at 09:12

## 2020-12-14 RX ADMIN — ATORVASTATIN CALCIUM 40 MG: 40 TABLET, FILM COATED ORAL at 09:12

## 2020-12-14 RX ADMIN — FUROSEMIDE 40 MG: 10 INJECTION, SOLUTION INTRAMUSCULAR; INTRAVENOUS at 05:12

## 2020-12-14 RX ADMIN — HYDRALAZINE HYDROCHLORIDE 25 MG: 25 TABLET, FILM COATED ORAL at 09:12

## 2020-12-14 RX ADMIN — FAMOTIDINE 20 MG: 20 TABLET, FILM COATED ORAL at 09:12

## 2020-12-14 RX ADMIN — DOXYCYCLINE HYCLATE 100 MG: 100 TABLET, COATED ORAL at 09:12

## 2020-12-14 RX ADMIN — HYDRALAZINE HYDROCHLORIDE 25 MG: 25 TABLET, FILM COATED ORAL at 03:12

## 2020-12-14 NOTE — ASSESSMENT & PLAN NOTE
Bradycardia. Asymptomatic. Caused by Labetalol given in ER    Avoid BB and CCB  Start Heparin gtt    12/14/2020  -Continue IV heparin gtt for cardio-embolic protection  -Avoid BB, CCB given persistent afib with slow VR response  -Add Hydralazine 25 mg TID  -Tele monitoring  -Assess response in AM

## 2020-12-14 NOTE — PLAN OF CARE
12/14/20 1539   Post-Acute Status   Post-Acute Authorization Home Health   Home Health Status Referrals Sent   Discharge Plan   Discharge Plan A Home with family;Home Health   Discharge Plan B Home with family;Home Health       Rene Quintanilla LMSW 12/14/2020 3:40 PM

## 2020-12-14 NOTE — PLAN OF CARE
POC reviewed, did not verbalize understanding. Pt appears comfortable and is calm. Pt remained free from falls, fall precautions in place. Pt is afib-simba (40s) on monitor. VSS. No other c/o at this time. PIV intact, heparin @ 12u/kg. Call bell and personal belongings within reach. Pt cannot communicate in English at all and MALIK  does not understand her specific dialect. Hourly rounding complete. Reminded to call for assistance. Will continue to monitor.

## 2020-12-14 NOTE — NURSING
Alerted by monitor tech that pt HR dropped to 32bpm, but went back up into 40s. Assessed pt, vss. Notified Dr. Gray and he said he is ok with HR being in 40s while pt is sleeping.

## 2020-12-14 NOTE — PROGRESS NOTES
[facility]  Cardiology  Progress Note    Patient Name: Dana Wyman  MRN: [id number]  Admission Date: 12/13/2020  Hospital Length of Stay: 0 days  Code Status: DNR   Attending Physician: Bry Jensen DO   Primary Care Physician: Radha Cleary MD  Expected Discharge Date:   Principal Problem:Acute combined systolic and diastolic congestive heart failure    Subjective:   HPI    88 yo female, consult for CHF and bradycardia  PMH CAD s/p PCI at [facility], HTN chronic afib on Plavix, lung mass stable, CKD. Tuvaluan speaking    Admitted for SOB and leg swelling up to lower abd.  In ER, /119 mmHG and EKG showed Afib HR 79. Had one dose of labetalol IVP.  BNP 2844 Troponin 0.034, HGN 11 and Cr 1.7  CXR pulm edema  EKG Afib  CT chest abd showed anasarca, pleural effusion and lung mass        Hospital Course:   12/14/2020-Patient seen and examined in room, lying in bed. Remains in AFIB with slow VR. Elevated BP noted o/n, meds adjusted to improve HR and BP control. Labs reviewed, H/H 10/35, K+ 4.1, Cr 1.6. Remains on IV heparin gtt for cardio-embolic protection.     Interval History: no acute issues noted. Remains in AFIB with slow VR response in 30-40s. Will recommend medical management of new onset AFIB for now. Continue IV heparin gtt for cardio-embolic protection. Medical therapy adjusted for HR/BP control today. Further recs to follow.     Review of Systems   Constitution: Positive for malaise/fatigue.   HENT: Negative for hearing loss and hoarse voice.    Eyes: Negative for blurred vision and visual disturbance.   Cardiovascular: Positive for irregular heartbeat and leg swelling. Negative for chest pain, claudication, dyspnea on exertion, near-syncope, orthopnea, palpitations, paroxysmal nocturnal dyspnea and syncope.   Respiratory: Negative for cough, hemoptysis, shortness of breath, sleep disturbances due to breathing, snoring and wheezing.    Endocrine: Negative for cold intolerance and heat  intolerance.   Hematologic/Lymphatic: Does not bruise/bleed easily.   Skin: Negative for color change, dry skin and nail changes.   Musculoskeletal: Positive for back pain, joint pain and myalgias.   Gastrointestinal: Negative for bloating, abdominal pain, constipation, nausea and vomiting.   Genitourinary: Negative for dysuria, flank pain, hematuria and hesitancy.   Neurological: Negative for headaches, light-headedness, loss of balance, numbness, paresthesias and weakness.   Psychiatric/Behavioral: Negative for altered mental status.   Allergic/Immunologic: Positive for environmental allergies.     Objective:     Vital Signs (Most Recent):  Temp: 98.3 °F (36.8 °C) (12/14/20 1114)  Pulse: (!) 46 (12/14/20 1114)  Resp: 20 (12/14/20 1114)  BP: (!) 183/83 (12/14/20 1114)  SpO2: 100 % (12/14/20 1114) Vital Signs (24h Range):  Temp:  [96.3 °F (35.7 °C)-98.6 °F (37 °C)] 98.3 °F (36.8 °C)  Pulse:  [35-57] 46  Resp:  [16-22] 20  SpO2:  [98 %-100 %] 100 %  BP: (127-199)/(62-83) 183/83     Weight: 64.4 kg (142 lb)  Body mass index is 28.68 kg/m².     SpO2: 100 %  O2 Device (Oxygen Therapy): nasal cannula      Intake/Output Summary (Last 24 hours) at 12/14/2020 1355  Last data filed at 12/14/2020 0800  Gross per 24 hour   Intake 216 ml   Output 5800 ml   Net -5584 ml       Lines/Drains/Airways     Drain            Female External Urinary Catheter 12/13/20 1220 1 day          Peripheral Intravenous Line                 Peripheral IV - Single Lumen 12/13/20 1136 20 G Right Antecubital 1 day                Physical Exam   Constitutional: She is oriented to person, place, and time. She appears well-developed and well-nourished. No distress.   HENT:   Head: Normocephalic and atraumatic.   Eyes: Pupils are equal, round, and reactive to light.   Neck: Normal range of motion and full passive range of motion without pain. Neck supple. No JVD present.   Cardiovascular: S1 normal, S2 normal and intact distal pulses. An irregularly  irregular rhythm present. Bradycardia present. PMI is not displaced. Exam reveals distant heart sounds.   No murmur heard.  Pulses:       Radial pulses are 2+ on the right side and 2+ on the left side.        Dorsalis pedis pulses are 2+ on the right side and 2+ on the left side.   Remains in AFIB, Slow VR   Pulmonary/Chest: Effort normal and breath sounds normal. No accessory muscle usage. No respiratory distress. She has no decreased breath sounds. She has no wheezes. She has no rales.   Abdominal: Soft. Bowel sounds are normal. She exhibits no distension. There is no abdominal tenderness.   Musculoskeletal: Normal range of motion.         General: Edema present.      Right ankle: She exhibits swelling.      Left ankle: She exhibits swelling.   Neurological: She is alert and oriented to person, place, and time.   Skin: Skin is warm and dry. She is not diaphoretic. No cyanosis. Nails show no clubbing.   Psychiatric: She has a normal mood and affect. Her speech is normal and behavior is normal. Judgment and thought content normal. Cognition and memory are normal.   Nursing note and vitals reviewed.      Significant Labs:   BMP:   Recent Labs   Lab 12/13/20  1135 12/14/20  0659    88    143   K 4.0 4.1    104   CO2 26 31*   BUN 23 19   CREATININE 1.7* 1.6*   CALCIUM 8.7 8.6*   MG 2.2 2.0   , CBC   Recent Labs   Lab 12/13/20  1135 12/14/20  0659   WBC 4.83 3.76*   HGB 11.1* 10.6*   HCT 37.2 35.3*    142*   , Troponin   Recent Labs   Lab 12/13/20  1135   TROPONINI 0.034*    and All pertinent lab results from the last 24 hours have been reviewed.    Significant Imaging: Echocardiogram:   Transthoracic echo (TTE) complete (Cupid Only):   Results for orders placed or performed during the hospital encounter of 12/13/20   Echo Color Flow Doppler? Yes   Result Value Ref Range    BSA 1.64 m2    LA WIDTH 4.75 cm    PV PEAK VELOCITY 1.14 cm/s    LVIDd 4.51 3.5 - 6.0 cm    IVS 1.00 0.6 - 1.1 cm     Posterior Wall 0.99 0.6 - 1.1 cm    LVIDs 3.29 2.1 - 4.0 cm    FS 27 28 - 44 %    LA volume 116.73 cm3    Sinus 3.04 cm    STJ 2.23 cm    Ascending aorta 3.14 cm    LV mass 152.77 g    LA size 5.03 cm    TAPSE 0.96 cm    Left Ventricle Relative Wall Thickness 0.44 cm    AV mean gradient 6 mmHg    AV valve area 1.36 cm2    AV Velocity Ratio 0.45     AV index (prosthetic) 0.45     E/A ratio 3.93     E wave decelartion time 201.30 msec    IVRT 138.41 msec    LVOT diameter 1.96 cm    LVOT area 3.0 cm2    LVOT peak raleigh 0.78 m/s    LVOT peak VTI 18.45 cm    Ao peak raleigh 1.72 m/s    Ao VTI 41.04 cm    Mr max raleigh 0.06 m/s    LVOT stroke volume 55.64 cm3    AV peak gradient 12 mmHg    MV Peak E [place] 1.10 m/s    TR Max [place] 3.16 m/s    MV Peak A [place] 0.28 m/s    LV Systolic Volume 43.79 mL    LV Systolic Volume Index 27.5 mL/m2    LV Diastolic Volume 92.74 mL    LV Diastolic Volume Index 58.17 mL/m2    LA Volume Index 73.2 mL/m2    LV Mass Index 96 g/m2    RA Major Axis 6.27 cm    Left Atrium Minor Axis 6.08 cm    Left Atrium Major Axis 5.45 cm    Triscuspid Valve Regurgitation Peak Gradient 40 mmHg    RA Width 3.85 cm    Right Atrial Pressure (from IVC) 3 mmHg    TV rest pulmonary artery pressure 43 mmHg    Narrative    · The left ventricle is normal in size with mildly decreased systolic   function. The estimated ejection fraction is 40%.  · Severe left atrial enlargement.  · Grade III diastolic dysfunction.  · Normal right ventricular size with normal right ventricular systolic   function.  · Normal central venous pressure (3 mmHg).  · The estimated PA systolic pressure is 43 mmHg.  · There is pulmonary hypertension.  · Small pericardial effusion.  · There are segmental left ventricular wall motion abnormalities.        Assessment and Plan:       * Acute combined systolic and diastolic congestive heart failure  CHF and anasarca    Echo in AM  Continue diuresis  DASh and fluid restriction    12/14/2020  -Continue IV lasix  BID  -Avoid BB, CCB given bradycardia  -Dash diet,2  Gm sodium restriction  -1500 ml fluid restriction  -Daily weights  -Strict I/Os  -Assess response  -Continue ARB    Elevated troponin  Demanding ischemia from CHF and uncontrolled HTN  Continue conservative medical mgmt for now    Persistent atrial fibrillation  Bradycardia. Asymptomatic. Caused by Labetalol given in ER    Avoid BB and CCB  Start Heparin gtt    12/14/2020  -Continue IV heparin gtt for cardio-embolic protection  -Avoid BB, CCB given persistent afib with slow VR response  -Add Hydralazine 25 mg TID  -Tele monitoring  -Assess response in AM      Essential hypertension  Continue ARB, Hydralazine  Monitor Bp trend and adjust as needed.     Coronary artery disease involving native coronary artery  Continue ASA, Statin, ARB, Hydralazine      CKD (chronic kidney disease), stage III  Monitor closely given need for IV diuresis        VTE Risk Mitigation (From admission, onward)         Ordered     heparin 25,000 units in dextrose 5% (100 units/ml) IV bolus from bag - ADDITIONAL PRN BOLUS - 60 units/kg  As needed (PRN)     Question:  Heparin Infusion Adjustment (DO NOT MODIFY ANSWER)  Answer:  \[email or web address]\epic\Images\Pharmacy\HeparinInfusions\heparin LOW INTENSITY nomogram for [facility] [id number]    12/13/20 1755     heparin 25,000 units in dextrose 5% (100 units/ml) IV bolus from bag - ADDITIONAL PRN BOLUS - 30 units/kg  As needed (PRN)     Question:  Heparin Infusion Adjustment (DO NOT MODIFY ANSWER)  Answer:  \\[email or web address]\epic\Images\Pharmacy\HeparinInfusions\heparin LOW INTENSITY nomogram for [facility] [id number]    12/13/20 1755     heparin 25,000 units in dextrose 5% 250 mL (100 units/mL) infusion LOW INTENSITY nomogram - [facility]  Continuous     Question Answer Comment   Heparin Infusion Adjustment (DO NOT MODIFY ANSWER) \\[email or web address]\epic\Images\Pharmacy\HeparinInfusions\heparin LOW INTENSITY nomogram for [facility] [id number]    Begin at (in units/kg/hr) 12         12/13/20 1755     IP VTE HIGH RISK PATIENT  Once      12/13/20 1543     Place sequential compression device  Until discontinued      12/13/20 1543                CHRISTOPHER VásquezP-C  Cardiology  [facility]

## 2020-12-14 NOTE — PROGRESS NOTES
Received patient for care via stretcher from the ER - patient transferred to bed using sheet, all extra linens removed in order to get an accurate bed weight - full assessment completed, vital sign taken, monitor placed on patient, unable to verify with monitor tech D/T no answer when call place to monitor room - patient showing Bradycardia on monitor in hallway. Pure wick in place on patient and draining clear yellow urine. Room air sat was 87-88%, continued O2 @ 2L/NC and sat increased to 99%.

## 2020-12-14 NOTE — PROGRESS NOTES
Received report from Lola in the ER. Patient does not speak English and there are no family with her - they were not able to locate an  that understands her dialect of Peruvian.

## 2020-12-14 NOTE — SUBJECTIVE & OBJECTIVE
Interval History: no acute issues noted. Remains in AFIB with slow VR response in 30-40s. Will recommend medical management of new onset AFIB for now. Continue IV heparin gtt for cardio-embolic protection. Medical therapy adjusted for HR/BP control today. Further recs to follow.     Review of Systems   Constitution: Positive for malaise/fatigue.   HENT: Negative for hearing loss and hoarse voice.    Eyes: Negative for blurred vision and visual disturbance.   Cardiovascular: Positive for irregular heartbeat and leg swelling. Negative for chest pain, claudication, dyspnea on exertion, near-syncope, orthopnea, palpitations, paroxysmal nocturnal dyspnea and syncope.   Respiratory: Negative for cough, hemoptysis, shortness of breath, sleep disturbances due to breathing, snoring and wheezing.    Endocrine: Negative for cold intolerance and heat intolerance.   Hematologic/Lymphatic: Does not bruise/bleed easily.   Skin: Negative for color change, dry skin and nail changes.   Musculoskeletal: Positive for back pain, joint pain and myalgias.   Gastrointestinal: Negative for bloating, abdominal pain, constipation, nausea and vomiting.   Genitourinary: Negative for dysuria, flank pain, hematuria and hesitancy.   Neurological: Negative for headaches, light-headedness, loss of balance, numbness, paresthesias and weakness.   Psychiatric/Behavioral: Negative for altered mental status.   Allergic/Immunologic: Positive for environmental allergies.     Objective:     Vital Signs (Most Recent):  Temp: 98.3 °F (36.8 °C) (12/14/20 1114)  Pulse: (!) 46 (12/14/20 1114)  Resp: 20 (12/14/20 1114)  BP: (!) 183/83 (12/14/20 1114)  SpO2: 100 % (12/14/20 1114) Vital Signs (24h Range):  Temp:  [96.3 °F (35.7 °C)-98.6 °F (37 °C)] 98.3 °F (36.8 °C)  Pulse:  [35-57] 46  Resp:  [16-22] 20  SpO2:  [98 %-100 %] 100 %  BP: (127-199)/(62-83) 183/83     Weight: 64.4 kg (142 lb)  Body mass index is 28.68 kg/m².     SpO2: 100 %  O2 Device (Oxygen Therapy):  nasal cannula      Intake/Output Summary (Last 24 hours) at 12/14/2020 1355  Last data filed at 12/14/2020 0800  Gross per 24 hour   Intake 216 ml   Output 5800 ml   Net -5584 ml       Lines/Drains/Airways     Drain            Female External Urinary Catheter 12/13/20 1220 1 day          Peripheral Intravenous Line                 Peripheral IV - Single Lumen 12/13/20 1136 20 G Right Antecubital 1 day                Physical Exam   Constitutional: She is oriented to person, place, and time. She appears well-developed and well-nourished. No distress.   HENT:   Head: Normocephalic and atraumatic.   Eyes: Pupils are equal, round, and reactive to light.   Neck: Normal range of motion and full passive range of motion without pain. Neck supple. No JVD present.   Cardiovascular: S1 normal, S2 normal and intact distal pulses. An irregularly irregular rhythm present. Bradycardia present. PMI is not displaced. Exam reveals distant heart sounds.   No murmur heard.  Pulses:       Radial pulses are 2+ on the right side and 2+ on the left side.        Dorsalis pedis pulses are 2+ on the right side and 2+ on the left side.   Remains in AFIB, Slow VR   Pulmonary/Chest: Effort normal and breath sounds normal. No accessory muscle usage. No respiratory distress. She has no decreased breath sounds. She has no wheezes. She has no rales.   Abdominal: Soft. Bowel sounds are normal. She exhibits no distension. There is no abdominal tenderness.   Musculoskeletal: Normal range of motion.         General: Edema present.      Right ankle: She exhibits swelling.      Left ankle: She exhibits swelling.   Neurological: She is alert and oriented to person, place, and time.   Skin: Skin is warm and dry. She is not diaphoretic. No cyanosis. Nails show no clubbing.   Psychiatric: She has a normal mood and affect. Her speech is normal and behavior is normal. Judgment and thought content normal. Cognition and memory are normal.   Nursing note and  vitals reviewed.      Significant Labs:   BMP:   Recent Labs   Lab 12/13/20  1135 12/14/20  0659    88    143   K 4.0 4.1    104   CO2 26 31*   BUN 23 19   CREATININE 1.7* 1.6*   CALCIUM 8.7 8.6*   MG 2.2 2.0   , CBC   Recent Labs   Lab 12/13/20  1135 12/14/20  0659   WBC 4.83 3.76*   HGB 11.1* 10.6*   HCT 37.2 35.3*    142*   , Troponin   Recent Labs   Lab 12/13/20  1135   TROPONINI 0.034*    and All pertinent lab results from the last 24 hours have been reviewed.    Significant Imaging: Echocardiogram:   Transthoracic echo (TTE) complete (Cupid Only):   Results for orders placed or performed during the hospital encounter of 12/13/20   Echo Color Flow Doppler? Yes   Result Value Ref Range    BSA 1.64 m2    LA WIDTH 4.75 cm    PV PEAK VELOCITY 1.14 cm/s    LVIDd 4.51 3.5 - 6.0 cm    IVS 1.00 0.6 - 1.1 cm    Posterior Wall 0.99 0.6 - 1.1 cm    LVIDs 3.29 2.1 - 4.0 cm    FS 27 28 - 44 %    LA volume 116.73 cm3    Sinus 3.04 cm    STJ 2.23 cm    Ascending aorta 3.14 cm    LV mass 152.77 g    LA size 5.03 cm    TAPSE 0.96 cm    Left Ventricle Relative Wall Thickness 0.44 cm    AV mean gradient 6 mmHg    AV valve area 1.36 cm2    AV Velocity Ratio 0.45     AV index (prosthetic) 0.45     E/A ratio 3.93     E wave decelartion time 201.30 msec    IVRT 138.41 msec    LVOT diameter 1.96 cm    LVOT area 3.0 cm2    LVOT peak r[place] 0.78 m/s    LVOT peak VTI 18.45 cm    Ao peak [place] 1.72 m/s    Ao VTI 41.04 cm    Mr max [place] 0.06 m/s    LVOT stroke volume 55.64 cm3    AV peak gradient 12 mmHg    MV Peak E [place] 1.10 m/s    TR Max [place] 3.16 m/s    MV Peak A [place] 0.28 m/s    LV Systolic Volume 43.79 mL    LV Systolic Volume Index 27.5 mL/m2    LV Diastolic Volume 92.74 mL    LV Diastolic Volume Index 58.17 mL/m2    LA Volume Index 73.2 mL/m2    LV Mass Index 96 g/m2    RA Major Axis 6.27 cm    Left Atrium Minor Axis 6.08 cm    Left Atrium Major Axis 5.45 cm    Triscuspid Valve Regurgitation Peak Gradient 40 mmHg     RA Width 3.85 cm    Right Atrial Pressure (from IVC) 3 mmHg    TV rest pulmonary artery pressure 43 mmHg    Narrative    · The left ventricle is normal in size with mildly decreased systolic   function. The estimated ejection fraction is 40%.  · Severe left atrial enlargement.  · Grade III diastolic dysfunction.  · Normal right ventricular size with normal right ventricular systolic   function.  · Normal central venous pressure (3 mmHg).  · The estimated PA systolic pressure is 43 mmHg.  · There is pulmonary hypertension.  · Small pericardial effusion.  · There are segmental left ventricular wall motion abnormalities.

## 2020-12-14 NOTE — PLAN OF CARE
Unable to discuss Plan of Care with patient R/T she does not speak English and there is not an  available that speaks her dialect. Patient remains is awake and alert - remains free of falls, accidents and trauma during the day shift. Bed is in the low position and the call light is within reach. To be started on Heparin drip. Will continue to monitor

## 2020-12-14 NOTE — HOSPITAL COURSE
12/14/2020-Patient seen and examined in room, lying in bed. Remains in AFIB with slow VR. Elevated BP noted o/n, meds adjusted to improve HR and BP control. Labs reviewed, H/H 10/35, K+ 4.1, Cr 1.6. Remains on IV heparin gtt for cardio-embolic protection.     12/15/2020-Patient seen and examined in room, lying in bed. No available  due to no one speaks patient's dialect. In no obvious signs of distress. Leg edema has significantly improved. Has diuresed another 4 L in past 24 hours. Avoid clonidine in future for BP control due to bradycardia issues. Has transitioned from Heparin infusion to Eliquis and Plavix today. Clinically much improved today on exam.     12/16/2020-Patient seen and examined in room, lying in bed. Feels good today. In no obvious distress on exam today. OK to discharge from cardiology standpoint.

## 2020-12-14 NOTE — PLAN OF CARE
KVNG spoke with pt's Granddaughter via phone to complete discharge assessment. Pt lives at home with her Grandson and his spouse. Pt's help at home his pt's granddaughter and granddaughter in law. Pt's family will pick her up when she is discharged. Pt uses a RW as needed. Pt is ambulatory and ADLs. Pt's Granddaughter said pt needs [facility] and NP at home. SW provided family with Waiver program number. No other needs identified at this time. KVNG explained  transitional care folder, information on advanced directives, information on pharmacy bedside delivery, and discharge planning begins on admission with contact information for any needs/questions.     Payor: MEDICARE / Plan: MEDICARE PART A & B / Product Type: Government /   PCP: Radha Cleary MD  Pharmacy:   [id number] - [place] - [address] AT [facility]  [address]  [place] [address]  Phone: [phone] Fax: [phone]  Bedside Delivery: Yes  MyChart: Link sent       12/14/20 102   Discharge Assessment   Assessment Type Discharge Planning Assessment   Confirmed/corrected address and phone number on facesheet? Yes   Assessment information obtained from? Caregiver;Other  (Barbara Daughter( granddaughter) [phone])   Expected Length of Stay (days)   (TBD)   Communicated expected length of stay with patient/caregiver yes   Prior to hospitilization cognitive status: Alert/Oriented   Prior to hospitalization functional status: Independent   Current cognitive status: Alert/Oriented   Current Functional Status: Independent   Facility Arrived From: home   Lives With grandchild(leilani)   Able to Return to Prior Arrangements yes   Is patient able to care for self after discharge? Yes   Who are your caregiver(s) and their phone number(s)? Barbara Daughter (granddaughter) [phone]   Patient's perception of discharge disposition home or selfcare;home health   Readmission Within the Last 30 Days no previous  admission in last 30 days   Patient currently being followed by outpatient case management? No   Patient currently receives any other outside agency services? No   Equipment Currently Used at Home walker, rolling   Part D Coverage n/a   Do you have any problems affording any of your prescribed medications? No   Is the patient taking medications as prescribed? yes   Does the patient have transportation home? Yes   Transportation Anticipated family or friend will provide   Dialysis Name and Scheduled days n/a   Does the patient receive services at the Coumadin Clinic? No   Discharge Plan A Home Health;Home with family   Discharge Plan B Home Health;Home with family   DME Needed Upon Discharge  none   Patient/Family in Agreement with Plan yes   Does the patient have transportation to healthcare appointments? No       Rene Quintanilla LMSW 12/14/2020 3:37 PM

## 2020-12-14 NOTE — PROGRESS NOTES
Pharmacist Renal Dose Adjustment Note    Dana Wyman is a 89 y.o. female being treated with the medication famotidine.     Patient Data:    Vital Signs (Most Recent):  Temp: 97.2 °F (36.2 °C) (12/13/20 1553)  Pulse: (!) 48 (12/13/20 1553)  Resp: 20 (12/13/20 1553)  BP: (!) 168/72 (12/13/20 1553)  SpO2: 100 % (12/13/20 1553)   Vital Signs (72h Range):  Temp:  [97.2 °F (36.2 °C)-98.6 °F (37 °C)]   Pulse:  [48-78]   Resp:  [17-24]   BP: (166-239)/()   SpO2:  [97 %-100 %]      Recent Labs   Lab 12/13/20  1135   CREATININE 1.7*     Serum creatinine: 1.7 mg/dL (H) 12/13/20 1135  Estimated creatinine clearance: 23.5 mL/min (A)    Medication famotidine 20 mg PO BID will be changed to medication famotidine 20 mg PO daily per pharmacy renal dose adjustment protocol for patients with CrCl 10-50 mL/min.    Pharmacist's Name: Nilam Munguia PharmD  Pharmacist's Extension: [phone]    Thank you for allowing us to participate in this patient's care.     Nilam Munguia PharmD 12/13/2020 7:19 PM

## 2020-12-14 NOTE — NURSING
Heparin not started on day shift due to PTT not being drawn. Will start heparin per order/nomagram when PTT has resulted. PTT drawn at 2100, not resulted until 2355.

## 2020-12-14 NOTE — ASSESSMENT & PLAN NOTE
CHF and anasarca    Echo in AM  Continue diuresis  DASh and fluid restriction    12/14/2020  -Continue IV lasix BID  -Avoid BB, CCB given bradycardia  -Dash diet,2  Gm sodium restriction  -1500 ml fluid restriction  -Daily weights  -Strict I/Os  -Assess response  -Continue ARB

## 2020-12-15 PROBLEM — I50.31 ACUTE DIASTOLIC CONGESTIVE HEART FAILURE: Status: ACTIVE | Noted: 2020-12-13

## 2020-12-15 LAB
ALBUMIN SERPL BCP-MCNC: 2.2 G/DL (ref 3.5–5.2)
ALP SERPL-CCNC: 94 U/L (ref 55–135)
ALT SERPL W/O P-5'-P-CCNC: 13 U/L (ref 10–44)
ANION GAP SERPL CALC-SCNC: 4 MMOL/L (ref 8–16)
ANISOCYTOSIS BLD QL SMEAR: SLIGHT
APTT BLDCRRT: 62.1 SEC (ref 21–32)
APTT BLDCRRT: 67.1 SEC (ref 21–32)
AST SERPL-CCNC: 22 U/L (ref 10–40)
BASOPHILS # BLD AUTO: 0.03 K/UL (ref 0–0.2)
BASOPHILS NFR BLD: 0.8 % (ref 0–1.9)
BILIRUB SERPL-MCNC: 0.5 MG/DL (ref 0.1–1)
BUN SERPL-MCNC: 17 MG/DL (ref 8–23)
BURR CELLS BLD QL SMEAR: ABNORMAL
CALCIUM SERPL-MCNC: 8.1 MG/DL (ref 8.7–10.5)
CHLORIDE SERPL-SCNC: 101 MMOL/L (ref 95–110)
CO2 SERPL-SCNC: 38 MMOL/L (ref 23–29)
CREAT SERPL-MCNC: 1.6 MG/DL (ref 0.5–1.4)
DACRYOCYTES BLD QL SMEAR: ABNORMAL
DIFFERENTIAL METHOD: ABNORMAL
EOSINOPHIL # BLD AUTO: 0.4 K/UL (ref 0–0.5)
EOSINOPHIL NFR BLD: 11.4 % (ref 0–8)
ERYTHROCYTE [DISTWIDTH] IN BLOOD BY AUTOMATED COUNT: 18.3 % (ref 11.5–14.5)
EST. GFR  (AFRICAN AMERICAN): 33 ML/MIN/1.73 M^2
EST. GFR  (NON AFRICAN AMERICAN): 28 ML/MIN/1.73 M^2
GLUCOSE SERPL-MCNC: 83 MG/DL (ref 70–110)
HCT VFR BLD AUTO: 30.9 % (ref 37–48.5)
HGB BLD-MCNC: 9.5 G/DL (ref 12–16)
HYPOCHROMIA BLD QL SMEAR: ABNORMAL
IMM GRANULOCYTES # BLD AUTO: 0.01 K/UL (ref 0–0.04)
IMM GRANULOCYTES NFR BLD AUTO: 0.3 % (ref 0–0.5)
LYMPHOCYTES # BLD AUTO: 1 K/UL (ref 1–4.8)
LYMPHOCYTES NFR BLD: 28.9 % (ref 18–48)
MAGNESIUM SERPL-MCNC: 1.6 MG/DL (ref 1.6–2.6)
MCH RBC QN AUTO: 28 PG (ref 27–31)
MCHC RBC AUTO-ENTMCNC: 30.7 G/DL (ref 32–36)
MCV RBC AUTO: 91 FL (ref 82–98)
MONOCYTES # BLD AUTO: 0.4 K/UL (ref 0.3–1)
MONOCYTES NFR BLD: 11.1 % (ref 4–15)
NEUTROPHILS # BLD AUTO: 1.7 K/UL (ref 1.8–7.7)
NEUTROPHILS NFR BLD: 47.5 % (ref 38–73)
NRBC BLD-RTO: 0 /100 WBC
OVALOCYTES BLD QL SMEAR: ABNORMAL
PLATELET # BLD AUTO: 103 K/UL (ref 150–350)
PLATELET BLD QL SMEAR: ABNORMAL
PMV BLD AUTO: ABNORMAL FL (ref 9.2–12.9)
POIKILOCYTOSIS BLD QL SMEAR: SLIGHT
POLYCHROMASIA BLD QL SMEAR: ABNORMAL
POTASSIUM SERPL-SCNC: 3.7 MMOL/L (ref 3.5–5.1)
PROT SERPL-MCNC: 5.9 G/DL (ref 6–8.4)
RBC # BLD AUTO: 3.39 M/UL (ref 4–5.4)
SODIUM SERPL-SCNC: 143 MMOL/L (ref 136–145)
TARGETS BLD QL SMEAR: ABNORMAL
WBC # BLD AUTO: 3.6 K/UL (ref 3.9–12.7)

## 2020-12-15 PROCEDURE — 36415 COLL VENOUS BLD VENIPUNCTURE: CPT

## 2020-12-15 PROCEDURE — 25000003 PHARM REV CODE 250: Performed by: FAMILY MEDICINE

## 2020-12-15 PROCEDURE — 25000003 PHARM REV CODE 250: Performed by: INTERNAL MEDICINE

## 2020-12-15 PROCEDURE — 63600175 PHARM REV CODE 636 W HCPCS: Performed by: FAMILY MEDICINE

## 2020-12-15 PROCEDURE — 99233 SBSQ HOSP IP/OBS HIGH 50: CPT | Mod: ,,, | Performed by: FAMILY MEDICINE

## 2020-12-15 PROCEDURE — 94761 N-INVAS EAR/PLS OXIMETRY MLT: CPT

## 2020-12-15 PROCEDURE — 99233 SBSQ HOSP IP/OBS HIGH 50: CPT | Mod: ,,, | Performed by: INTERNAL MEDICINE

## 2020-12-15 PROCEDURE — 85730 THROMBOPLASTIN TIME PARTIAL: CPT

## 2020-12-15 PROCEDURE — 85025 COMPLETE CBC W/AUTO DIFF WBC: CPT

## 2020-12-15 PROCEDURE — 99233 PR SUBSEQUENT HOSPITAL CARE,LEVL III: ICD-10-PCS | Mod: ,,, | Performed by: FAMILY MEDICINE

## 2020-12-15 PROCEDURE — 99233 PR SUBSEQUENT HOSPITAL CARE,LEVL III: ICD-10-PCS | Mod: ,,, | Performed by: INTERNAL MEDICINE

## 2020-12-15 PROCEDURE — 21400001 HC TELEMETRY ROOM

## 2020-12-15 PROCEDURE — 25000003 PHARM REV CODE 250: Performed by: NURSE PRACTITIONER

## 2020-12-15 PROCEDURE — 83735 ASSAY OF MAGNESIUM: CPT

## 2020-12-15 PROCEDURE — 80053 COMPREHEN METABOLIC PANEL: CPT

## 2020-12-15 PROCEDURE — 97161 PT EVAL LOW COMPLEX 20 MIN: CPT

## 2020-12-15 PROCEDURE — 97116 GAIT TRAINING THERAPY: CPT

## 2020-12-15 PROCEDURE — 27000221 HC OXYGEN, UP TO 24 HOURS

## 2020-12-15 PROCEDURE — 99900035 HC TECH TIME PER 15 MIN (STAT)

## 2020-12-15 RX ORDER — HYDRALAZINE HYDROCHLORIDE 50 MG/1
50 TABLET, FILM COATED ORAL EVERY 8 HOURS
Status: DISCONTINUED | OUTPATIENT
Start: 2020-12-15 | End: 2020-12-16 | Stop reason: HOSPADM

## 2020-12-15 RX ORDER — CLONIDINE HYDROCHLORIDE 0.1 MG/1
0.1 TABLET ORAL ONCE
Status: COMPLETED | OUTPATIENT
Start: 2020-12-15 | End: 2020-12-15

## 2020-12-15 RX ORDER — POTASSIUM CHLORIDE 20 MEQ/1
20 TABLET, EXTENDED RELEASE ORAL DAILY
Status: DISCONTINUED | OUTPATIENT
Start: 2020-12-15 | End: 2020-12-15

## 2020-12-15 RX ORDER — CLOPIDOGREL BISULFATE 75 MG/1
75 TABLET ORAL DAILY
Status: DISCONTINUED | OUTPATIENT
Start: 2020-12-15 | End: 2020-12-16 | Stop reason: HOSPADM

## 2020-12-15 RX ORDER — POTASSIUM CHLORIDE 750 MG/1
10 TABLET, EXTENDED RELEASE ORAL DAILY
Status: DISCONTINUED | OUTPATIENT
Start: 2020-12-15 | End: 2020-12-16 | Stop reason: HOSPADM

## 2020-12-15 RX ADMIN — CLONIDINE HYDROCHLORIDE 0.1 MG: 0.1 TABLET ORAL at 05:12

## 2020-12-15 RX ADMIN — HYDRALAZINE HYDROCHLORIDE 50 MG: 50 TABLET, FILM COATED ORAL at 02:12

## 2020-12-15 RX ADMIN — HYDRALAZINE HYDROCHLORIDE 50 MG: 50 TABLET, FILM COATED ORAL at 09:12

## 2020-12-15 RX ADMIN — DOXYCYCLINE HYCLATE 100 MG: 100 TABLET, COATED ORAL at 10:12

## 2020-12-15 RX ADMIN — HYDRALAZINE HYDROCHLORIDE 50 MG: 50 TABLET, FILM COATED ORAL at 10:12

## 2020-12-15 RX ADMIN — FUROSEMIDE 40 MG: 10 INJECTION, SOLUTION INTRAMUSCULAR; INTRAVENOUS at 03:12

## 2020-12-15 RX ADMIN — POTASSIUM CHLORIDE 10 MEQ: 750 TABLET, EXTENDED RELEASE ORAL at 09:12

## 2020-12-15 RX ADMIN — DOXYCYCLINE HYCLATE 100 MG: 100 TABLET, COATED ORAL at 09:12

## 2020-12-15 RX ADMIN — ATORVASTATIN CALCIUM 40 MG: 40 TABLET, FILM COATED ORAL at 09:12

## 2020-12-15 RX ADMIN — HYDRALAZINE HYDROCHLORIDE 25 MG: 25 TABLET, FILM COATED ORAL at 05:12

## 2020-12-15 RX ADMIN — FAMOTIDINE 20 MG: 20 TABLET, FILM COATED ORAL at 09:12

## 2020-12-15 RX ADMIN — CLOPIDOGREL 75 MG: 75 TABLET, FILM COATED ORAL at 09:12

## 2020-12-15 RX ADMIN — APIXABAN 2.5 MG: 2.5 TABLET, FILM COATED ORAL at 10:12

## 2020-12-15 RX ADMIN — LOSARTAN POTASSIUM 25 MG: 25 TABLET, FILM COATED ORAL at 09:12

## 2020-12-15 NOTE — ASSESSMENT & PLAN NOTE
Bradycardia. Asymptomatic. Caused by Labetalol given in ER    Avoid BB and CCB  Start Heparin gtt    12/14/2020  -Continue IV heparin gtt for cardio-embolic protection  -Avoid BB, CCB given persistent afib with slow VR response  -Add Hydralazine 25 mg TID  -Tele monitoring  -Assess response in AM    12/15  -Ok to transition to low dose eliquis for cardio-embolic protection  -Continue Eliquis, Hydralazine  -Tele monitoring

## 2020-12-15 NOTE — ASSESSMENT & PLAN NOTE
CHF and anasarca    Echo in AM  Continue diuresis  DASh and fluid restriction    12/14/2020  -Continue IV lasix BID  -Avoid BB, CCB given bradycardia  -Dash diet,2  Gm sodium restriction  -1500 ml fluid restriction  -Daily weights  -Strict I/Os  -Assess response  -Continue ARB    12/15/2020  -continue IV lasix, ARB, Hydralazine  -Improved clinically today

## 2020-12-15 NOTE — NURSING
Admit profile unable to be completed due to dementia and foreign language speaking only.  No historian available at bedside.

## 2020-12-15 NOTE — ASSESSMENT & PLAN NOTE
Complicates communication. She speaks Italian but daughter reports that she doesn't always comprehend

## 2020-12-15 NOTE — ASSESSMENT & PLAN NOTE
Echo shows combined systolic and diastolic with ef of 40%  Appreciate cardiac recs  Continue aggressive diuresis with IV furosemide but monitor renal function closely. Plan to transition to orals tomorrow

## 2020-12-15 NOTE — HOSPITAL COURSE
12/15:  Continues good diuresis.  She is 8 kg since admission.  Renal function remains stable.  Discussed case with Cardiology.  Switching from heparin to Plavix and aspirin.  She did receive 1 dose of clonidine overnight.  Discontinuing this and increasing the hydralazine.  Continue on same dose of losartan.  At a low dose of potassium has this has trended down but using caution with chronic kidney disease.  May be ready for discharge tomorrow but could benefit from some additional diuresis.    12/16: Diuresed more than 9 L since admission and clinically better. Still with rales B/L but suspect some aspect of chronic. CKD has remained stable during admission despite aggressive diuresis. Discharging without BB due to simba, 40 furosemide daily along with 10meq of K. Must follow up with PCP. Home health and home NP visit being arranged.

## 2020-12-15 NOTE — PROGRESS NOTES
[facility] Hospital Medicine  Progress Note    Patient Name: Dana Wyman  MRN: [id number]  Patient Class: IP- Inpatient   Admission Date: 12/13/2020  Length of Stay: 1 days  Attending Physician: Bry Jensen DO  Primary Care Provider: Radha Cleary MD        Subjective:     Principal Problem:Acute combined systolic and diastolic congestive heart failure        HPI:  89-year-old [place] female presenting today for acute anasarca and shortness of breath.  She has past medical history significant for diastolic congestive heart failure with last echo available from 2018 at [facility].  She also has history of CAD and persistent atrial fibrillation.  Med reconciliation difficult to complete at this time but wrist per discussion with family member it sounds like she was out of some of her medications.  Her primary care physician is Dr. Tommie Butler and I reviewed his note from July for history as well as speaking to daughter Barbara ([phone])  She was given 1 time dose furosemide of 60 mg IV in the emergency room and diuresed 2 L before arriving on telemetry.  It was noted that she had heart rate of 78 on EKG but currently in the mid to low 40 range.  Consulted with cardiology who will see patient.  Felt that it may be secondary to 1 time dose of labetalol.  Plan is to continue focus on diuresis.  Echocardiogram in the morning.    Overview/Hospital Course:  12/15:  Continues good diuresis.  She is 8 kg since admission.  Renal function remains stable.  Discussed case with Cardiology.  Switching from heparin to Plavix and aspirin.  She did receive 1 dose of clonidine overnight.  Discontinuing this and increasing the hydralazine.  Continue on same dose of losartan.  At a low dose of potassium has this has trended down but using caution with chronic kidney disease.  May be ready for discharge tomorrow but could benefit from some additional diuresis.    Interval History: Continues good  diuresis.  She is 8 kg since admission.  Renal function remains stable.  Discussed case with Cardiology.  Switching from heparin to Plavix and aspirin.  She did receive 1 dose of clonidine overnight.  Discontinuing this and increasing the hydralazine.  Continue on same dose of losartan.  At a low dose of potassium has this has trended down but using caution with chronic kidney disease.  May be ready for discharge tomorrow but could benefit from some additional diuresis.    Review of Systems   Unable to perform ROS: Dementia     Objective:     Vital Signs (Most Recent):  Temp: 97.9 °F (36.6 °C) (12/15/20 0842)  Pulse: (!) 50 (12/15/20 0842)  Resp: 16 (12/15/20 0842)  BP: (!) 140/62 (12/15/20 0842)  SpO2: 99 % (12/15/20 0842) Vital Signs (24h Range):  Temp:  [97.4 °F (36.3 °C)-98.6 °F (37 °C)] 97.9 °F (36.6 °C)  Pulse:  [40-61] 50  Resp:  [16-20] 16  SpO2:  [95 %-100 %] 99 %  BP: (140-183)/(62-83) 140/62     Weight: 61.4 kg (135 lb 5.8 oz)  Body mass index is 27.34 kg/m².    Intake/Output Summary (Last 24 hours) at 12/15/2020 1026  Last data filed at 12/15/2020 0600  Gross per 24 hour   Intake 752.6 ml   Output 3325 ml   Net -2572.4 ml      Physical Exam  Constitutional:       General: She is not in acute distress.     Appearance: She is well-developed. She is not diaphoretic.   HENT:      Head: Normocephalic and atraumatic.      Nose: Nose normal.   Eyes:      General:         Right eye: No discharge.         Left eye: No discharge.      Conjunctiva/sclera: Conjunctivae normal.      Pupils: Pupils are equal, round, and reactive to light.   Neck:      Thyroid: No thyromegaly.   Cardiovascular:      Rate and Rhythm: Regular rhythm. Bradycardia present.      Pulses: Normal pulses.      Heart sounds: No murmur.   Pulmonary:      Effort: Pulmonary effort is normal. No respiratory distress.      Breath sounds: Rales (bilateral to mid lung fields) present.   Abdominal:      General: There is no distension.      Palpations:  Abdomen is soft.   Musculoskeletal:         General: Swelling (improving but still trace swelling present) present.      Right lower leg: Edema (1+ pitting edema b/l) present.      Left lower leg: Edema present.   Skin:     Findings: No rash.   Neurological:      Mental Status: She is alert and oriented to person, place, and time.   Psychiatric:         Behavior: Behavior normal.      Comments: Pleasantly demented         Significant Labs:   BMP:   Recent Labs   Lab 12/15/20  0644   GLU 83      K 3.7      CO2 38*   BUN 17   CREATININE 1.6*   CALCIUM 8.1*   MG 1.6     CBC:   Recent Labs   Lab 12/13/20  1135 12/14/20  0659 12/15/20  0644   WBC 4.83 3.76* 3.60*   HGB 11.1* 10.6* 9.5*   HCT 37.2 35.3* 30.9*    142* 103*     CMP:   Recent Labs   Lab 12/13/20  1135 12/14/20  0659 12/15/20  0644    143 143   K 4.0 4.1 3.7    104 101   CO2 26 31* 38*    88 83   BUN 23 19 17   CREATININE 1.7* 1.6* 1.6*   CALCIUM 8.7 8.6* 8.1*   PROT 7.4 6.6 5.9*   ALBUMIN 2.8* 2.3* 2.2*   BILITOT 0.6 0.5 0.5   ALKPHOS 116 102 94   AST 31 25 22   ALT 18 13 13   ANIONGAP 7* 8 4*   EGFRNONAA 26* 28* 28*       Significant Imaging: I have reviewed all pertinent imaging results/findings within the past 24 hours.      Assessment/Plan:      * Acute combined systolic and diastolic congestive heart failure  Echo shows combined systolic and diastolic with ef of 40%  Appreciate cardiac recs  Continue aggressive diuresis with IV furosemide but monitor renal function closely. Plan to transition to orals tomorrow       Persistent atrial fibrillation  Will restart on eliquis 2.5 as outpatient      Mass of upper lobe of right lung  As above      Essential hypertension  Elevated but improving. Hydralazine increased. Monitor at this time. Diuresis will likely help      Coronary artery disease involving native coronary artery  No recommendation for intervention at this time. Med mgmt      CKD (chronic kidney disease), stage  III  Decreased GFR. Stable. Low dose of K added as it has been trending down with furosemid    Bilateral hearing loss  Complicating communication even in [place]ian with       Bilateral carotid artery stenosis  Unclear whether she has been on these. Will resume       Alzheimer's dementia with behavioral disturbance  Complicates communication. She speaks Thai but daughter reports that she doesn't always comprehend        VTE Risk Mitigation (From admission, onward)         Ordered     apixaban tablet 2.5 mg  2 times daily      12/15/20 0859     IP VTE HIGH RISK PATIENT  Once      12/13/20 1543     Place sequential compression device  Until discontinued      12/13/20 1543                Discharge Planning   JAMEL:      Code Status: DNR   Is the patient medically ready for discharge?:     Reason for patient still in hospital (select all that apply): Patient trending condition  Discharge Plan A: Home with family, Home Health                  Bry Jensen DO  Department of Hospital Medicine   [facility]

## 2020-12-15 NOTE — PROGRESS NOTES
[facility]  Cardiology  Progress Note    Patient Name: Dana Wyman  MRN: [id number]  Admission Date: 12/13/2020  Hospital Length of Stay: 1 days  Code Status: DNR   Attending Physician: Bry Jensen DO   Primary Care Physician: Radha Cleary MD  Expected Discharge Date:   Principal Problem:Acute diastolic congestive heart failure    Subjective:   HPI    89 yo female, consult for CHF and bradycardia  PMH CAD s/p PCI at [facility], HTN chronic afib on Plavix, lung mass stable, CKD. German speaking    Admitted for SOB and leg swelling up to lower abd.  In ER, /119 mmHG and EKG showed Afib HR 79. Had one dose of labetalol IVP.  BNP 2844 Troponin 0.034, HGN 11 and Cr 1.7  CXR pulm edema  EKG Afib  CT chest abd showed anasarca, pleural effusion and lung mass      Hospital Course:   12/14/2020-Patient seen and examined in room, lying in bed. Remains in AFIB with slow VR. Elevated BP noted o/n, meds adjusted to improve HR and BP control. Labs reviewed, H/H 10/35, K+ 4.1, Cr 1.6. Remains on IV heparin gtt for cardio-embolic protection.     12/15/2020-Patient seen and examined in room, lying in bed. No available  due to no one speaks patient's dialect. In no obvious signs of distress. Leg edema has significantly improved. Has diuresed another 4 L in past 24 hours. Avoid clonidine in future for BP control due to bradycardia issues. Has transitioned from Heparin infusion to Eliquis and Plavix today. Clinically much improved today on exam.     Interval History: no acute issues noted o/n. Meds adjusted for BP control today. HR improving with medical therapy adjustments. Assess response in AM.     Review of Systems   Constitution: Positive for malaise/fatigue.   HENT: Negative for hearing loss and hoarse voice.    Eyes: Negative for blurred vision and visual disturbance.   Cardiovascular: Positive for irregular heartbeat and leg swelling. Negative for chest pain, claudication, dyspnea on  exertion, near-syncope, orthopnea, palpitations, paroxysmal nocturnal dyspnea and syncope.   Respiratory: Negative for cough, hemoptysis, shortness of breath, sleep disturbances due to breathing, snoring and wheezing.    Endocrine: Negative for cold intolerance and heat intolerance.   Hematologic/Lymphatic: Does not bruise/bleed easily.   Skin: Negative for color change, dry skin and nail changes.   Musculoskeletal: Positive for back pain, joint pain and myalgias.   Gastrointestinal: Negative for bloating, abdominal pain, constipation, nausea and vomiting.   Genitourinary: Negative for dysuria, flank pain, hematuria and hesitancy.   Neurological: Negative for headaches, light-headedness, loss of balance, numbness, paresthesias and weakness.   Psychiatric/Behavioral: Negative for altered mental status.   Allergic/Immunologic: Positive for environmental allergies.     Objective:     Vital Signs (Most Recent):  Temp: 98.6 °F (37 °C) (12/15/20 1120)  Pulse: 61 (12/15/20 1120)  Resp: 16 (12/15/20 1120)  BP: (!) 160/66 (12/15/20 1120)  SpO2: (!) 90 % (12/15/20 1120) Vital Signs (24h Range):  Temp:  [97.4 °F (36.3 °C)-98.6 °F (37 °C)] 98.6 °F (37 °C)  Pulse:  [40-61] 61  Resp:  [16-20] 16  SpO2:  [90 %-99 %] 90 %  BP: (140-179)/(62-81) 160/66     Weight: 61.4 kg (135 lb 5.8 oz)  Body mass index is 27.34 kg/m².     SpO2: (!) 90 %  O2 Device (Oxygen Therapy): room air      Intake/Output Summary (Last 24 hours) at 12/15/2020 1415  Last data filed at 12/15/2020 0600  Gross per 24 hour   Intake 752.6 ml   Output 3325 ml   Net -2572.4 ml       Lines/Drains/Airways     Drain            Female External Urinary Catheter 12/13/20 1220 2 days          Peripheral Intravenous Line                 Peripheral IV - Single Lumen 12/13/20 1136 20 G Right Antecubital 2 days                Physical Exam   Constitutional: She is oriented to person, place, and time. She appears well-developed and well-nourished. No distress.   HENT:   Head:  Normocephalic and atraumatic.   Eyes: Pupils are equal, round, and reactive to light.   Neck: Normal range of motion and full passive range of motion without pain. Neck supple. No JVD present.   Cardiovascular: Normal rate, S1 normal, S2 normal and intact distal pulses. An irregularly irregular rhythm present. PMI is not displaced. Exam reveals no distant heart sounds.   No murmur heard.  Pulses:       Radial pulses are 2+ on the right side and 2+ on the left side.        Dorsalis pedis pulses are 2+ on the right side and 2+ on the left side.   Remains in AFIB, Slow VR   Pulmonary/Chest: Effort normal and breath sounds normal. No accessory muscle usage. No respiratory distress. She has no decreased breath sounds. She has no wheezes. She has no rales.   Abdominal: Soft. Bowel sounds are normal. She exhibits no distension. There is no abdominal tenderness.   Musculoskeletal: Normal range of motion.         General: Edema present.      Right ankle: She exhibits swelling.      Left ankle: She exhibits swelling.      Comments: Trace LE edema   Neurological: She is alert and oriented to person, place, and time.   Skin: Skin is warm and dry. She is not diaphoretic. No cyanosis. Nails show no clubbing.   Psychiatric: She has a normal mood and affect. Her speech is normal and behavior is normal. Judgment and thought content normal. Cognition and memory are normal.   Nursing note and vitals reviewed.      Significant Labs:   BMP:   Recent Labs   Lab 12/14/20  0659 12/15/20  0644   GLU 88 83    143   K 4.1 3.7    101   CO2 31* 38*   BUN 19 17   CREATININE 1.6* 1.6*   CALCIUM 8.6* 8.1*   MG 2.0 1.6   , CBC   Recent Labs   Lab 12/14/20  0659 12/15/20  0644   WBC 3.76* 3.60*   HGB 10.6* 9.5*   HCT 35.3* 30.9*   * 103*    and All pertinent lab results from the last 24 hours have been reviewed.    Significant Imaging: Echocardiogram:   Transthoracic echo (TTE) complete (Cupid Only):   Results for orders placed or  performed during the hospital encounter of 12/13/20   Echo Color Flow Doppler? Yes   Result Value Ref Range    BSA 1.64 m2    LA WIDTH 4.75 cm    PV PEAK VELOCITY 1.14 cm/s    LVIDd 4.51 3.5 - 6.0 cm    IVS 1.00 0.6 - 1.1 cm    Posterior Wall 0.99 0.6 - 1.1 cm    LVIDs 3.29 2.1 - 4.0 cm    FS 27 28 - 44 %    LA volume 116.73 cm3    Sinus 3.04 cm    STJ 2.23 cm    Ascending aorta 3.14 cm    LV mass 152.77 g    LA size 5.03 cm    TAPSE 0.96 cm    Left Ventricle Relative Wall Thickness 0.44 cm    AV mean gradient 6 mmHg    AV valve area 1.36 cm2    AV Velocity Ratio 0.45     AV index (prosthetic) 0.45     E/A ratio 3.93     E wave decelartion time 201.30 msec    IVRT 138.41 msec    LVOT diameter 1.96 cm    LVOT area 3.0 cm2    LVOT peak [place] 0.78 m/s    LVOT peak VTI 18.45 cm    Ao peak [place] 1.72 m/s    Ao VTI 41.04 cm    Mr max [place] 0.06 m/s    LVOT stroke volume 55.64 cm3    AV peak gradient 12 mmHg    MV Peak E [place] 1.10 m/s    TR Max [place] 3.16 m/s    MV Peak A [place] 0.28 m/s    LV Systolic Volume 43.79 mL    LV Systolic Volume Index 27.5 mL/m2    LV Diastolic Volume 92.74 mL    LV Diastolic Volume Index 58.17 mL/m2    LA Volume Index 73.2 mL/m2    LV Mass Index 96 g/m2    RA Major Axis 6.27 cm    Left Atrium Minor Axis 6.08 cm    Left Atrium Major Axis 5.45 cm    Triscuspid Valve Regurgitation Peak Gradient 40 mmHg    RA Width 3.85 cm    Right Atrial Pressure (from IVC) 3 mmHg    TV rest pulmonary artery pressure 43 mmHg    Narrative    · The left ventricle is normal in size with mildly decreased systolic   function. The estimated ejection fraction is 40%.  · Severe left atrial enlargement.  · Grade III diastolic dysfunction.  · Normal right ventricular size with normal right ventricular systolic   function.  · Normal central venous pressure (3 mmHg).  · The estimated PA systolic pressure is 43 mmHg.  · There is pulmonary hypertension.  · Small pericardial effusion.  · There are segmental left ventricular wall motion  abnormalities.        Assessment and Plan:       * Acute diastolic congestive heart failure  CHF and anasarca    Echo in AM  Continue diuresis  DASh and fluid restriction    12/14/2020  -Continue IV lasix BID  -Avoid BB, CCB given bradycardia  -Dash diet,2  Gm sodium restriction  -1500 ml fluid restriction  -Daily weights  -Strict I/Os  -Assess response  -Continue ARB    12/15/2020  -continue IV lasix, ARB, Hydralazine  -Improved clinically today    Elevated troponin  Demanding ischemia from CHF and uncontrolled HTN  Continue conservative medical mgmt for now    Persistent atrial fibrillation  Bradycardia. Asymptomatic. Caused by Labetalol given in ER    Avoid BB and CCB  Start Heparin gtt    12/14/2020  -Continue IV heparin gtt for cardio-embolic protection  -Avoid BB, CCB given persistent afib with slow VR response  -Add Hydralazine 25 mg TID  -Tele monitoring  -Assess response in AM    12/15  -Ok to transition to low dose eliquis for cardio-embolic protection  -Continue Eliquis, Hydralazine  -Tele monitoring    Essential hypertension  Continue ARB, Hydralazine  Monitor Bp trend and adjust as needed.     Coronary artery disease involving native coronary artery  Continue ASA, Statin, ARB, Hydralazine      CKD (chronic kidney disease), stage III  Monitor closely given need for IV diuresis        VTE Risk Mitigation (From admission, onward)         Ordered     apixaban tablet 2.5 mg  2 times daily      12/15/20 0859     IP VTE HIGH RISK PATIENT  Once      12/13/20 1543     Place sequential compression device  Until discontinued      12/13/20 1543                HAROON Vásquez  Cardiology  [facility]

## 2020-12-15 NOTE — SUBJECTIVE & OBJECTIVE
Interval History: no acute issues noted o/n. Meds adjusted for BP control today. HR improving with medical therapy adjustments. Assess response in AM.     Review of Systems   Constitution: Positive for malaise/fatigue.   HENT: Negative for hearing loss and hoarse voice.    Eyes: Negative for blurred vision and visual disturbance.   Cardiovascular: Positive for irregular heartbeat and leg swelling. Negative for chest pain, claudication, dyspnea on exertion, near-syncope, orthopnea, palpitations, paroxysmal nocturnal dyspnea and syncope.   Respiratory: Negative for cough, hemoptysis, shortness of breath, sleep disturbances due to breathing, snoring and wheezing.    Endocrine: Negative for cold intolerance and heat intolerance.   Hematologic/Lymphatic: Does not bruise/bleed easily.   Skin: Negative for color change, dry skin and nail changes.   Musculoskeletal: Positive for back pain, joint pain and myalgias.   Gastrointestinal: Negative for bloating, abdominal pain, constipation, nausea and vomiting.   Genitourinary: Negative for dysuria, flank pain, hematuria and hesitancy.   Neurological: Negative for headaches, light-headedness, loss of balance, numbness, paresthesias and weakness.   Psychiatric/Behavioral: Negative for altered mental status.   Allergic/Immunologic: Positive for environmental allergies.     Objective:     Vital Signs (Most Recent):  Temp: 98.6 °F (37 °C) (12/15/20 1120)  Pulse: 61 (12/15/20 1120)  Resp: 16 (12/15/20 1120)  BP: (!) 160/66 (12/15/20 1120)  SpO2: (!) 90 % (12/15/20 1120) Vital Signs (24h Range):  Temp:  [97.4 °F (36.3 °C)-98.6 °F (37 °C)] 98.6 °F (37 °C)  Pulse:  [40-61] 61  Resp:  [16-20] 16  SpO2:  [90 %-99 %] 90 %  BP: (140-179)/(62-81) 160/66     Weight: 61.4 kg (135 lb 5.8 oz)  Body mass index is 27.34 kg/m².     SpO2: (!) 90 %  O2 Device (Oxygen Therapy): room air      Intake/Output Summary (Last 24 hours) at 12/15/2020 1415  Last data filed at 12/15/2020 0600  Gross per 24 hour    Intake 752.6 ml   Output 3325 ml   Net -2572.4 ml       Lines/Drains/Airways     Drain            Female External Urinary Catheter 12/13/20 1220 2 days          Peripheral Intravenous Line                 Peripheral IV - Single Lumen 12/13/20 1136 20 G Right Antecubital 2 days                Physical Exam   Constitutional: She is oriented to person, place, and time. She appears well-developed and well-nourished. No distress.   HENT:   Head: Normocephalic and atraumatic.   Eyes: Pupils are equal, round, and reactive to light.   Neck: Normal range of motion and full passive range of motion without pain. Neck supple. No JVD present.   Cardiovascular: Normal rate, S1 normal, S2 normal and intact distal pulses. An irregularly irregular rhythm present. PMI is not displaced. Exam reveals no distant heart sounds.   No murmur heard.  Pulses:       Radial pulses are 2+ on the right side and 2+ on the left side.        Dorsalis pedis pulses are 2+ on the right side and 2+ on the left side.   Remains in AFIB, Slow VR   Pulmonary/Chest: Effort normal and breath sounds normal. No accessory muscle usage. No respiratory distress. She has no decreased breath sounds. She has no wheezes. She has no rales.   Abdominal: Soft. Bowel sounds are normal. She exhibits no distension. There is no abdominal tenderness.   Musculoskeletal: Normal range of motion.         General: Edema present.      Right ankle: She exhibits swelling.      Left ankle: She exhibits swelling.      Comments: Trace LE edema   Neurological: She is alert and oriented to person, place, and time.   Skin: Skin is warm and dry. She is not diaphoretic. No cyanosis. Nails show no clubbing.   Psychiatric: She has a normal mood and affect. Her speech is normal and behavior is normal. Judgment and thought content normal. Cognition and memory are normal.   Nursing note and vitals reviewed.      Significant Labs:   BMP:   Recent Labs   Lab 12/14/20  0659 12/15/20  0644   GLU 88  83    143   K 4.1 3.7    101   CO2 31* 38*   BUN 19 17   CREATININE 1.6* 1.6*   CALCIUM 8.6* 8.1*   MG 2.0 1.6   , CBC   Recent Labs   Lab 12/14/20  0659 12/15/20  0644   WBC 3.76* 3.60*   HGB 10.6* 9.5*   HCT 35.3* 30.9*   * 103*    and All pertinent lab results from the last 24 hours have been reviewed.    Significant Imaging: Echocardiogram:   Transthoracic echo (TTE) complete (Cupid Only):   Results for orders placed or performed during the hospital encounter of 12/13/20   Echo Color Flow Doppler? Yes   Result Value Ref Range    BSA 1.64 m2    LA WIDTH 4.75 cm    PV PEAK VELOCITY 1.14 cm/s    LVIDd 4.51 3.5 - 6.0 cm    IVS 1.00 0.6 - 1.1 cm    Posterior Wall 0.99 0.6 - 1.1 cm    LVIDs 3.29 2.1 - 4.0 cm    FS 27 28 - 44 %    LA volume 116.73 cm3    Sinus 3.04 cm    STJ 2.23 cm    Ascending aorta 3.14 cm    LV mass 152.77 g    LA size 5.03 cm    TAPSE 0.96 cm    Left Ventricle Relative Wall Thickness 0.44 cm    AV mean gradient 6 mmHg    AV valve area 1.36 cm2    AV Velocity Ratio 0.45     AV index (prosthetic) 0.45     E/A ratio 3.93     E wave decelartion time 201.30 msec    IVRT 138.41 msec    LVOT diameter 1.96 cm    LVOT area 3.0 cm2    LVOT peak [place] 0.78 m/s    LVOT peak VTI 18.45 cm    Ao peak [place] 1.72 m/s    Ao VTI 41.04 cm    Mr max [place] 0.06 m/s    LVOT stroke volume 55.64 cm3    AV peak gradient 12 mmHg    MV Peak E [place] 1.10 m/s    TR Max [place] 3.16 m/s    MV Peak A [place] 0.28 m/s    LV Systolic Volume 43.79 mL    LV Systolic Volume Index 27.5 mL/m2    LV Diastolic Volume 92.74 mL    LV Diastolic Volume Index 58.17 mL/m2    LA Volume Index 73.2 mL/m2    LV Mass Index 96 g/m2    RA Major Axis 6.27 cm    Left Atrium Minor Axis 6.08 cm    Left Atrium Major Axis 5.45 cm    Triscuspid Valve Regurgitation Peak Gradient 40 mmHg    RA Width 3.85 cm    Right Atrial Pressure (from IVC) 3 mmHg    TV rest pulmonary artery pressure 43 mmHg    Narrative    · The left ventricle is normal in size with  mildly decreased systolic   function. The estimated ejection fraction is 40%.  · Severe left atrial enlargement.  · Grade III diastolic dysfunction.  · Normal right ventricular size with normal right ventricular systolic   function.  · Normal central venous pressure (3 mmHg).  · The estimated PA systolic pressure is 43 mmHg.  · There is pulmonary hypertension.  · Small pericardial effusion.  · There are segmental left ventricular wall motion abnormalities.

## 2020-12-15 NOTE — PLAN OF CARE
Pt awake and alert. Doesn't speak English. Hypertensive. Afib (40-50s) on monitor. Oxygen at 2 L per NC. Heparin gtt at 9 units/kg/hr. No signs of pain or distress.   Strict I&O and daily weights.  Fall precautions in place, call bell in reach, bed in low and locked position.   Will continue to monitor.

## 2020-12-15 NOTE — SUBJECTIVE & OBJECTIVE
Interval History: Continues good diuresis.  She is 8 kg since admission.  Renal function remains stable.  Discussed case with Cardiology.  Switching from heparin to Plavix and aspirin.  She did receive 1 dose of clonidine overnight.  Discontinuing this and increasing the hydralazine.  Continue on same dose of losartan.  At a low dose of potassium has this has trended down but using caution with chronic kidney disease.  May be ready for discharge tomorrow but could benefit from some additional diuresis.    Review of Systems   Unable to perform ROS: Dementia     Objective:     Vital Signs (Most Recent):  Temp: 97.9 °F (36.6 °C) (12/15/20 0842)  Pulse: (!) 50 (12/15/20 0842)  Resp: 16 (12/15/20 0842)  BP: (!) 140/62 (12/15/20 0842)  SpO2: 99 % (12/15/20 0842) Vital Signs (24h Range):  Temp:  [97.4 °F (36.3 °C)-98.6 °F (37 °C)] 97.9 °F (36.6 °C)  Pulse:  [40-61] 50  Resp:  [16-20] 16  SpO2:  [95 %-100 %] 99 %  BP: (140-183)/(62-83) 140/62     Weight: 61.4 kg (135 lb 5.8 oz)  Body mass index is 27.34 kg/m².    Intake/Output Summary (Last 24 hours) at 12/15/2020 1026  Last data filed at 12/15/2020 0600  Gross per 24 hour   Intake 752.6 ml   Output 3325 ml   Net -2572.4 ml      Physical Exam  Constitutional:       General: She is not in acute distress.     Appearance: She is well-developed. She is not diaphoretic.   HENT:      Head: Normocephalic and atraumatic.      Nose: Nose normal.   Eyes:      General:         Right eye: No discharge.         Left eye: No discharge.      Conjunctiva/sclera: Conjunctivae normal.      Pupils: Pupils are equal, round, and reactive to light.   Neck:      Thyroid: No thyromegaly.   Cardiovascular:      Rate and Rhythm: Regular rhythm. Bradycardia present.      Pulses: Normal pulses.      Heart sounds: No murmur.   Pulmonary:      Effort: Pulmonary effort is normal. No respiratory distress.      Breath sounds: Rales (bilateral to mid lung fields) present.   Abdominal:      General: There is  no distension.      Palpations: Abdomen is soft.   Musculoskeletal:         General: Swelling (improving but still trace swelling present) present.      Right lower leg: Edema (1+ pitting edema b/l) present.      Left lower leg: Edema present.   Skin:     Findings: No rash.   Neurological:      Mental Status: She is alert and oriented to person, place, and time.   Psychiatric:         Behavior: Behavior normal.      Comments: Pleasantly demented         Significant Labs:   BMP:   Recent Labs   Lab 12/15/20  0644   GLU 83      K 3.7      CO2 38*   BUN 17   CREATININE 1.6*   CALCIUM 8.1*   MG 1.6     CBC:   Recent Labs   Lab 12/13/20  1135 12/14/20  0659 12/15/20  0644   WBC 4.83 3.76* 3.60*   HGB 11.1* 10.6* 9.5*   HCT 37.2 35.3* 30.9*    142* 103*     CMP:   Recent Labs   Lab 12/13/20  1135 12/14/20  0659 12/15/20  0644    143 143   K 4.0 4.1 3.7    104 101   CO2 26 31* 38*    88 83   BUN 23 19 17   CREATININE 1.7* 1.6* 1.6*   CALCIUM 8.7 8.6* 8.1*   PROT 7.4 6.6 5.9*   ALBUMIN 2.8* 2.3* 2.2*   BILITOT 0.6 0.5 0.5   ALKPHOS 116 102 94   AST 31 25 22   ALT 18 13 13   ANIONGAP 7* 8 4*   EGFRNONAA 26* 28* 28*       Significant Imaging: I have reviewed all pertinent imaging results/findings within the past 24 hours.

## 2020-12-16 VITALS
HEART RATE: 72 BPM | TEMPERATURE: 99 F | BODY MASS INDEX: 27.15 KG/M2 | RESPIRATION RATE: 18 BRPM | SYSTOLIC BLOOD PRESSURE: 181 MMHG | OXYGEN SATURATION: 96 % | DIASTOLIC BLOOD PRESSURE: 79 MMHG | HEIGHT: 59 IN | WEIGHT: 134.69 LBS

## 2020-12-16 PROBLEM — R79.89 ELEVATED TROPONIN: Status: RESOLVED | Noted: 2020-12-13 | Resolved: 2020-12-16

## 2020-12-16 LAB
ALBUMIN SERPL BCP-MCNC: 2.4 G/DL (ref 3.5–5.2)
ALP SERPL-CCNC: 98 U/L (ref 55–135)
ALT SERPL W/O P-5'-P-CCNC: 14 U/L (ref 10–44)
ANION GAP SERPL CALC-SCNC: 8 MMOL/L (ref 8–16)
AST SERPL-CCNC: 25 U/L (ref 10–40)
BILIRUB SERPL-MCNC: 0.7 MG/DL (ref 0.1–1)
BUN SERPL-MCNC: 17 MG/DL (ref 8–23)
CALCIUM SERPL-MCNC: 8.6 MG/DL (ref 8.7–10.5)
CHLORIDE SERPL-SCNC: 99 MMOL/L (ref 95–110)
CO2 SERPL-SCNC: 35 MMOL/L (ref 23–29)
CREAT SERPL-MCNC: 1.6 MG/DL (ref 0.5–1.4)
EST. GFR  (AFRICAN AMERICAN): 33 ML/MIN/1.73 M^2
EST. GFR  (NON AFRICAN AMERICAN): 28 ML/MIN/1.73 M^2
GLUCOSE SERPL-MCNC: 85 MG/DL (ref 70–110)
MAGNESIUM SERPL-MCNC: 1.6 MG/DL (ref 1.6–2.6)
POTASSIUM SERPL-SCNC: 3.5 MMOL/L (ref 3.5–5.1)
PROT SERPL-MCNC: 6.4 G/DL (ref 6–8.4)
SODIUM SERPL-SCNC: 142 MMOL/L (ref 136–145)

## 2020-12-16 PROCEDURE — 94761 N-INVAS EAR/PLS OXIMETRY MLT: CPT

## 2020-12-16 PROCEDURE — 25000003 PHARM REV CODE 250: Performed by: INTERNAL MEDICINE

## 2020-12-16 PROCEDURE — 90694 VACC AIIV4 NO PRSRV 0.5ML IM: CPT | Performed by: FAMILY MEDICINE

## 2020-12-16 PROCEDURE — 36415 COLL VENOUS BLD VENIPUNCTURE: CPT

## 2020-12-16 PROCEDURE — 80053 COMPREHEN METABOLIC PANEL: CPT

## 2020-12-16 PROCEDURE — 63600175 PHARM REV CODE 636 W HCPCS: Performed by: FAMILY MEDICINE

## 2020-12-16 PROCEDURE — 99239 PR HOSPITAL DISCHARGE DAY,>30 MIN: ICD-10-PCS | Mod: ,,, | Performed by: FAMILY MEDICINE

## 2020-12-16 PROCEDURE — 25000003 PHARM REV CODE 250: Performed by: FAMILY MEDICINE

## 2020-12-16 PROCEDURE — G0008 ADMIN INFLUENZA VIRUS VAC: HCPCS | Performed by: FAMILY MEDICINE

## 2020-12-16 PROCEDURE — 25000003 PHARM REV CODE 250: Performed by: NURSE PRACTITIONER

## 2020-12-16 PROCEDURE — 99233 SBSQ HOSP IP/OBS HIGH 50: CPT | Mod: ,,, | Performed by: INTERNAL MEDICINE

## 2020-12-16 PROCEDURE — 99239 HOSP IP/OBS DSCHRG MGMT >30: CPT | Mod: ,,, | Performed by: FAMILY MEDICINE

## 2020-12-16 PROCEDURE — 83735 ASSAY OF MAGNESIUM: CPT

## 2020-12-16 PROCEDURE — 99900035 HC TECH TIME PER 15 MIN (STAT)

## 2020-12-16 PROCEDURE — 27000221 HC OXYGEN, UP TO 24 HOURS

## 2020-12-16 PROCEDURE — 99233 PR SUBSEQUENT HOSPITAL CARE,LEVL III: ICD-10-PCS | Mod: ,,, | Performed by: INTERNAL MEDICINE

## 2020-12-16 PROCEDURE — 90471 IMMUNIZATION ADMIN: CPT | Performed by: FAMILY MEDICINE

## 2020-12-16 RX ORDER — HYDRALAZINE HYDROCHLORIDE 50 MG/1
50 TABLET, FILM COATED ORAL EVERY 8 HOURS
Qty: 90 TABLET | Refills: 11 | Status: SHIPPED | OUTPATIENT
Start: 2020-12-16 | End: 2021-12-16

## 2020-12-16 RX ORDER — METOPROLOL SUCCINATE 50 MG/1
50 TABLET, EXTENDED RELEASE ORAL DAILY
Qty: 30 TABLET | Refills: 11 | Status: SHIPPED | OUTPATIENT
Start: 2020-12-16 | End: 2020-12-16 | Stop reason: HOSPADM

## 2020-12-16 RX ORDER — FUROSEMIDE 40 MG/1
40 TABLET ORAL 2 TIMES DAILY
Qty: 60 TABLET | Refills: 11 | Status: SHIPPED | OUTPATIENT
Start: 2020-12-16 | End: 2020-12-16 | Stop reason: SDUPTHER

## 2020-12-16 RX ORDER — FUROSEMIDE 40 MG/1
40 TABLET ORAL DAILY
Qty: 30 TABLET | Refills: 11 | Status: ON HOLD | OUTPATIENT
Start: 2020-12-16 | End: 2021-01-27 | Stop reason: SDUPTHER

## 2020-12-16 RX ORDER — POTASSIUM CHLORIDE 750 MG/1
10 TABLET, EXTENDED RELEASE ORAL DAILY
Qty: 90 TABLET | Refills: 0 | Status: ON HOLD | OUTPATIENT
Start: 2020-12-16 | End: 2021-01-27 | Stop reason: SDUPTHER

## 2020-12-16 RX ADMIN — A/SINGAPORE/GP1908/2015 IVR-180 (AN A/MICHIGAN/45/2015 (H1N1)PDM09-LIKE VIRUS, A/HONG KONG/4801/2014, NYMC X-263B (H3N2) (AN A/HONG KONG/4801/2014-LIKE VIRUS), AND B/BRISBANE/60/2008, WILD TYPE (A B/BRISBANE/60/2008-LIKE VIRUS) 0.5 ML: 15; 15; 15 INJECTION, SUSPENSION INTRAMUSCULAR at 08:12

## 2020-12-16 RX ADMIN — HYDRALAZINE HYDROCHLORIDE 50 MG: 50 TABLET, FILM COATED ORAL at 01:12

## 2020-12-16 RX ADMIN — HYDRALAZINE HYDROCHLORIDE 50 MG: 50 TABLET, FILM COATED ORAL at 06:12

## 2020-12-16 RX ADMIN — ATORVASTATIN CALCIUM 40 MG: 40 TABLET, FILM COATED ORAL at 08:12

## 2020-12-16 RX ADMIN — POTASSIUM CHLORIDE 10 MEQ: 750 TABLET, EXTENDED RELEASE ORAL at 08:12

## 2020-12-16 RX ADMIN — DOXYCYCLINE HYCLATE 100 MG: 100 TABLET, COATED ORAL at 08:12

## 2020-12-16 RX ADMIN — LOSARTAN POTASSIUM 25 MG: 25 TABLET, FILM COATED ORAL at 08:12

## 2020-12-16 RX ADMIN — FUROSEMIDE 40 MG: 10 INJECTION, SOLUTION INTRAMUSCULAR; INTRAVENOUS at 06:12

## 2020-12-16 RX ADMIN — APIXABAN 2.5 MG: 2.5 TABLET, FILM COATED ORAL at 08:12

## 2020-12-16 RX ADMIN — CLOPIDOGREL 75 MG: 75 TABLET, FILM COATED ORAL at 08:12

## 2020-12-16 RX ADMIN — FAMOTIDINE 20 MG: 20 TABLET, FILM COATED ORAL at 08:12

## 2020-12-16 NOTE — PLAN OF CARE
Pt setup with Nurse Practitioner at Home.       12/16/20 1122   Final Note   Assessment Type Final Discharge Note   Anticipated Discharge Disposition Home-Health   Hospital Follow Up  Appt(s) scheduled? Yes   Discharge plans and expectations educations in teach back method with documentation complete? Yes   Right Care Referral Info   Post Acute Recommendation Home-care   Post-Acute Status   Post-Acute Authorization Home Health;HME   HME Status Set-up Complete   Home Health Status Set-up Complete       Rene Quintanilla LMSW 12/16/2020 11:23 AM

## 2020-12-16 NOTE — PLAN OF CARE
Pt free from fall/injury/trauma  Skin intact with no evidence of breakdown  Pt up with assistance to restroom  Pt transitioned to PO anticoagulants from IV heparin  Pt diuresing well with Lasix  Pt monitored on telemetry   Will continue to monitor

## 2020-12-16 NOTE — DISCHARGE SUMMARY
[facility] Hospital Medicine  Discharge Summary      Patient Name: Dana Wyman  MRN: [id number]  Admission Date: 12/13/2020  Hospital Length of Stay: 2 days  Discharge Date and Time:  12/16/2020 10:47 AM  Attending Physician: Bry Jensen DO   Discharging Provider: Bry Jensen DO  Primary Care Provider: Radha Cleary MD      HPI:   89-year-old Malay female presenting today for acute anasarca and shortness of breath.  She has past medical history significant for diastolic congestive heart failure with last echo available from 2018 at [facility].  She also has history of CAD and persistent atrial fibrillation.  Med reconciliation difficult to complete at this time but wrist per discussion with family member it sounds like she was out of some of her medications.  Her primary care physician is Dr. Tommie Butler and I reviewed his note from July for history as well as speaking to daughter Barbara ([phone])  She was given 1 time dose furosemide of 60 mg IV in the emergency room and diuresed 2 L before arriving on telemetry.  It was noted that she had heart rate of 78 on EKG but currently in the mid to low 40 range.  Consulted with cardiology who will see patient.  Felt that it may be secondary to 1 time dose of labetalol.  Plan is to continue focus on diuresis.  Echocardiogram in the morning.    * No surgery found *      Hospital Course:   12/15:  Continues good diuresis.  She is 8 kg since admission.  Renal function remains stable.  Discussed case with Cardiology.  Switching from heparin to Plavix and aspirin.  She did receive 1 dose of clonidine overnight.  Discontinuing this and increasing the hydralazine.  Continue on same dose of losartan.  At a low dose of potassium has this has trended down but using caution with chronic kidney disease.  May be ready for discharge tomorrow but could benefit from some additional diuresis.    12/16: Diuresed more than 9 L since admission  and clinically better. Still with rales B/L but suspect some aspect of chronic. CKD has remained stable during admission despite aggressive diuresis. Discharging without BB due to simba, 40 furosemide daily along with 10meq of K. Must follow up with PCP. Home health and home NP visit being arranged.      Consults:   Consults (From admission, onward)        Status Ordering Provider     Inpatient consult to Cardiology  Once     Provider:  Hansel Gray MD    Completed JOHN, NELLIE BLAS          * Acute combined systolic and diastolic congestive heart failure  Echo shows combined systolic and diastolic with ef of 40%  Discharging with furosemide 40qd, hydralazine and K supplement.       Elevated troponin  Demand ischemia in context of CHF      Persistent atrial fibrillation  Will restart on eliquis 2.5 as outpatient      Mass of upper lobe of right lung  As above      Essential hypertension  Elevated but improving. Hydralazine increased. Monitor at this time. Diuresis will likely help      Coronary artery disease involving native coronary artery  No recommendation for intervention at this time. Med mgmt      CKD (chronic kidney disease), stage III  Decreased GFR. Stable. Low dose of K added as it has been trending down with furosemid    Bilateral hearing loss  Complicating communication even in [place] with       Bilateral carotid artery stenosis  Unclear whether she has been on these. Will resume       Alzheimer's dementia with behavioral disturbance  Complicates communication. She speaks Georgian but daughter reports that she doesn't always comprehend        Final Active Diagnoses:    Diagnosis Date Noted POA    PRINCIPAL PROBLEM:  Acute combined systolic and diastolic congestive heart failure [I50.41] 12/13/2020 Yes    Elevated troponin [R77.8] 12/13/2020 Yes    Alzheimer's dementia with behavioral disturbance [G30.9, F02.81] 03/15/2019 Yes    Bilateral hearing loss [H91.93] 03/14/2019 Yes    CKD  "(chronic kidney disease), stage III [N18.30] 09/22/2018 Yes    Bilateral carotid artery stenosis [I65.23] 09/08/2018 Yes    Mass of upper lobe of right lung [R91.8] 09/06/2018 Yes    Persistent atrial fibrillation [I48.19] 09/06/2018 Yes    Essential hypertension [I10] 09/06/2018 Yes    Coronary artery disease involving native coronary artery [I25.10] 09/06/2018 Yes      Problems Resolved During this Admission:       Discharged Condition: good    Disposition: Home or Self Care with home health    Follow Up:  Follow-up Information     Radha Cleary MD In 3 days.    Specialty: Cardiology  Contact information:  [id number] [facility] [id number] [phone]                 Patient Instructions:      WALKER FOR HOME USE     Order Specific Question Answer Comments   Type of Walker: Rollator with brakes and/or seat    With wheels? Yes    Height: 4' 11" (1.499 m)    Weight: 61.1 kg (134 lb 11.2 oz)    Length of need (1-99 months): 99    Please check all that apply: Patient's condition impairs ambulation.    Please check all that apply: Patient is unable to safely ambulate without equipment.      Ambulatory referral/consult to [facility]   Standing Status: Future   Referral Priority: Routine Referral Type: Consultation   Referral Reason: Specialty Services Required   Number of Visits Requested: 1     Diet Cardiac     Notify your health care provider if you experience any of the following:  difficulty breathing or increased cough     Notify patient's primary care physician of admit     Measure electrolytes and kidney function    Order Comments: By adding BMP within 3-5 days of change in diuretic.     Activity as tolerated       Significant Diagnostic Studies: Labs:   CMP   Recent Labs   Lab 12/15/20  0644 12/16/20  0701    142   K 3.7 3.5    99   CO2 38* 35*   GLU 83 85   BUN 17 17   CREATININE 1.6* 1.6*   CALCIUM 8.1* 8.6*   PROT 5.9* 6.4   ALBUMIN 2.2* 2.4*   BILITOT " 0.5 0.7   ALKPHOS 94 98   AST 22 25   ALT 13 14   ANIONGAP 4* 8   ESTGFRAFRICA 33* 33*   EGFRNONAA 28* 28*    and CBC   Recent Labs   Lab 12/15/20  0644   WBC 3.60*   HGB 9.5*   HCT 30.9*   *     Cardiac Graphics: Echocardiogram: 2D echo with color flow doppler: No results found for this or any previous visit.    Pending Diagnostic Studies:     Procedure Component Value Units Date/Time    CT Chest Without Contrast [[id number]]     Order Status: Sent Lab Status: No result          Medications:  Reconciled Home Medications:      Medication List      START taking these medications    furosemide 40 MG tablet  Commonly known as: LASIX  Take 1 tablet (40 mg total) by mouth once daily.     hydrALAZINE 50 MG tablet  Commonly known as: APRESOLINE  Take 1 tablet (50 mg total) by mouth every 8 (eight) hours.     potassium chloride SA 10 MEQ tablet  Commonly known as: K-DUR,KLOR-CON  Take 1 tablet (10 mEq total) by mouth once daily.        CONTINUE taking these medications    albuterol 90 mcg/actuation inhaler  Commonly known as: PROVENTIL/VENTOLIN HFA  Inhale 2 puffs into the lungs every 6 (six) hours as needed.     atorvastatin 40 MG tablet  Commonly known as: LIPITOR  Take 40 mg by mouth once daily.     clopidogreL 75 mg tablet  Commonly known as: PLAVIX  Take 1 tablet by mouth once daily.     ELIQUIS 2.5 mg Tab  Generic drug: apixaban  Take 1 tablet by mouth 2 (two) times daily.     ketorolac 0.5% 0.5 % Drop  Commonly known as: ACULAR  1 drop 4 (four) times daily.     losartan 25 MG tablet  Commonly known as: COZAAR  Take 25 mg by mouth once daily.     omeprazole 40 MG capsule  Commonly known as: PRILOSEC  Take 40 mg by mouth once daily.     paroxetine 20 MG tablet  Commonly known as: PAXIL  Take 1 tablet by mouth once daily.        STOP taking these medications    amLODIPine 10 MG tablet  Commonly known as: NORVASC     ibuprofen 200 MG tablet  Commonly known as: ADVIL,MOTRIN     LORazepam 0.5 MG tablet  Commonly  known as: ATIVAN     ranitidine 150 MG tablet  Commonly known as: ZANTAC     valsartan-hydrochlorothiazide 320-12.5 mg per tablet  Commonly known as: DIOVAN-HCT            Indwelling Lines/Drains at time of discharge:   Lines/Drains/Airways     Drain            Female External Urinary Catheter 12/13/20 1220 2 days                Time spent on the discharge of patient: 45 minutes  Patient was seen and examined on the date of discharge and determined to be suitable for discharge.         Bry Jensen DO  Department of Hospital Medicine  [facility]

## 2020-12-16 NOTE — PT/OT/SLP EVAL
Physical Therapy Evaluation    Patient Name:  Dana Wyman   MRN:  [id number]    Recommendations:     Discharge Recommendations:  (P) home health PT(24 HOUR [facility])   Discharge Equipment Recommendations: (P) walker, rolling   Barriers to discharge: None    Assessment:     Dana Wyman is a 89 y.o. female admitted with a medical diagnosis of Acute diastolic congestive heart failure.  She presents with the following impairments/functional limitations:  (P) weakness, impaired endurance, impaired balance, gait instability, impaired functional mobilty, pain, decreased lower extremity function .    Rehab Prognosis: Good; patient would benefit from acute skilled PT services to address these deficits and reach maximum level of function.    Recent Surgery: * No surgery found *      Plan:     During this hospitalization, patient to be seen   to address the identified rehab impairments via   and progress toward the following goals:    · Plan of Care Expires:  (P) 12/22/20    Subjective     Chief Complaint: NONE   Patient/Family Comments/goals: NONE STATED   Pain/Comfort:  · Pain Rating 1: (P) 0/10  · Pain Rating Post-Intervention 1: (P) 0/10    Patients cultural, spiritual, Temple conflicts given the current situation:      Living Environment:   PT PLOF IS UNKNOWN.   Prior to admission, patients level of function was UNKNOWN.  Equipment used at home: (P) (UNKNOWN).  DME owned (not currently used): [facility].  Upon discharge, patient will have assistance from UNKNOWN.    Objective:     Communicated with NURSE CASTILLO AND Epic CHART REVIEW  prior to session.  Patient found supine with (P) PureWick, peripheral IV, oxygen  upon PT entry to room.    General Precautions: Standard, (P) fall   Orthopedic Precautions:(P) N/A   Braces: (P) N/A     Exams:  · RLE ROM: LIMITED  · RLE Strength: LIMIMTED  · LLE ROM: LIMITED  · LLE Strength: LIMITED    Functional Mobility:  PT MET IN RM SUP>SIT EOB WITH MIN A. PT SCOOTED TO EOB AND STOOD  WITH RW AND MIN A FOR GT X 20; WITH RW AND T/F ON COMMODE FOR TOILETING WITH MIN A. PT STOOD FROM COMMODE AND RETURNED TO BEDSIDE WITH MIN A. PT SUP IN BED WITH MIN A AND LEFT WITH BED ALARM ON AND ALL NEEDS MET.     AM-PAC 6 CLICK MOBILITY  Total Score:(P) 16     Patient left HOB elevated with call button in reach and bed alarm on.    GOALS:   Multidisciplinary Problems     Physical Therapy Goals        Problem: Physical Therapy Goal    Goal Priority Disciplines Outcome Goal Variances Interventions   Physical Therapy Goal     PT, PT/OT      Description: PT WILL BE SEEN FOR P.T. FOR A MIN OF 5 OUT OF 7 DAYS A WEEK  LT20  1. PT WILL COMPLETE BED MOBILITY WITH SBA  2. PT WILL GT TRAINED X 50' WITH RW AND SBA  3. PT WILL COMPLETE B LE TE X 20 REPS FOR STRENGTHENING.                    History:     Past Medical History:   Diagnosis Date    Aortic atherosclerosis     Atrial fibrillation     Carotid stenosis     CKD (chronic kidney disease) stage 3, GFR 30-59 ml/min     Coronary artery disease     h/o stent in LAD and circumflex    Dementia     Hearing impairment     Hypertension     Mass of upper lobe of right lung     chronic/stable. was followed by pulm at [facility] and opted for no diagnosis/tx due to slow growth/age.       Past Surgical History:   Procedure Laterality Date    HYSTERECTOMY         Time Tracking:     PT Received On: (P) 12/15/20  PT Start Time: (P) 1030     PT Stop Time: (P) 1100  PT Total Time (min): (P) 30 min     Billable Minutes: Evaluation 15 and Gait Training 15      Christina Casillas, PT  12/15/2020

## 2020-12-16 NOTE — PROGRESS NOTES
Home Oxygen Evaluation    Date Performed: 12/16/2020    1) Patient's Home O2 Sat on room air, while at rest: 88        If O2 sats on room air at rest are 88% or below, patient qualifies. No additional testing needed. Document N/A in steps 2 and 3. If 89% or above, complete steps 2.      2) Patient's O2 Sat on room air while exercising: NA        If O2 sats on room air while exercising remain 89% or above patient does not qualify, no further testing needed Document N/A in step 3. If O2 sats on room air while exercising are 88% or below, continue to step 3.      3) Patient's O2 Sat while exercising on O2: NA at NA LPM, NC 2L 95%.         (Must show improvement from #2 for patients to qualify)    If O2 sats improve on oxygen, patient qualifies for portable oxygen. If not, the patient does not qualify.

## 2020-12-16 NOTE — ASSESSMENT & PLAN NOTE
Bradycardia. Asymptomatic. Caused by Labetalol given in ER    Avoid BB and CCB  Start Heparin gtt    12/14/2020  -Continue IV heparin gtt for cardio-embolic protection  -Avoid BB, CCB given persistent afib with slow VR response  -Add Hydralazine 25 mg TID  -Tele monitoring  -Assess response in AM    12/15  -Ok to transition to low dose eliquis for cardio-embolic protection  -Continue Eliquis, Hydralazine  -Tele monitoring    12/16  -Continue Eliquis, Hydralazine  -Avoid BB or CCB given bradycardia this admission, resolved  -OP Cards follow up

## 2020-12-16 NOTE — ASSESSMENT & PLAN NOTE
Echo shows combined systolic and diastolic with ef of 40%  Discharging with furosemide 40qd, hydralazine and K supplement.

## 2020-12-16 NOTE — PROGRESS NOTES
[facility]  Cardiology  Progress Note    Patient Name: Dana Wyman  MRN: [id number]  Admission Date: 12/13/2020  Hospital Length of Stay: 2 days  Code Status: DNR   Attending Physician: Bry Jensen DO   Primary Care Physician: Radha Cleary MD  Expected Discharge Date: 12/16/2020  Principal Problem:Acute diastolic congestive heart failure    Subjective:   HPI    88 yo female, consult for CHF and bradycardia  PMH CAD s/p PCI at [facility], HTN chronic afib on Plavix, lung mass stable, CKD. Kinyarwanda speaking    Admitted for SOB and leg swelling up to lower abd.  In ER, /119 mmHG and EKG showed Afib HR 79. Had one dose of labetalol IVP.  BNP 2844 Troponin 0.034, HGN 11 and Cr 1.7  CXR pulm edema  EKG Afib  CT chest abd showed anasarca, pleural effusion and lung mass      Hospital Course:   12/14/2020-Patient seen and examined in room, lying in bed. Remains in AFIB with slow VR. Elevated BP noted o/n, meds adjusted to improve HR and BP control. Labs reviewed, H/H 10/35, K+ 4.1, Cr 1.6. Remains on IV heparin gtt for cardio-embolic protection.     12/15/2020-Patient seen and examined in room, lying in bed. No available  due to no one speaks patient's dialect. In no obvious signs of distress. Leg edema has significantly improved. Has diuresed another 4 L in past 24 hours. Avoid clonidine in future for BP control due to bradycardia issues. Has transitioned from Heparin infusion to Eliquis and Plavix today. Clinically much improved today on exam.     12/16/2020-Patient seen and examined in room, lying in bed. Feels good today. In no obvious distress on exam today. OK to discharge from cardiology standpoint.     Interval History: no acute issues noted o/n. OK to discharge home from cardiology standpoint. Needs OP Cards follow up.     Review of Systems   Constitution: Positive for malaise/fatigue.   HENT: Negative for hearing loss and hoarse voice.    Eyes: Negative for blurred vision and  visual disturbance.   Cardiovascular: Positive for irregular heartbeat and leg swelling. Negative for chest pain, claudication, dyspnea on exertion, near-syncope, orthopnea, palpitations, paroxysmal nocturnal dyspnea and syncope.   Respiratory: Negative for cough, hemoptysis, shortness of breath, sleep disturbances due to breathing, snoring and wheezing.    Endocrine: Negative for cold intolerance and heat intolerance.   Hematologic/Lymphatic: Does not bruise/bleed easily.   Skin: Negative for color change, dry skin and nail changes.   Musculoskeletal: Positive for back pain, joint pain and myalgias.   Gastrointestinal: Negative for bloating, abdominal pain, constipation, nausea and vomiting.   Genitourinary: Negative for dysuria, flank pain, hematuria and hesitancy.   Neurological: Negative for headaches, light-headedness, loss of balance, numbness, paresthesias and weakness.   Psychiatric/Behavioral: Negative for altered mental status.   Allergic/Immunologic: Positive for environmental allergies.     Objective:     Vital Signs (Most Recent):  Temp: 98 °F (36.7 °C) (12/16/20 0750)  Pulse: 79 (12/16/20 1104)  Resp: 17 (12/16/20 0750)  BP: (!) 149/98 (12/16/20 0750)  SpO2: (!) 91 % (12/16/20 0814) Vital Signs (24h Range):  Temp:  [97.6 °F (36.4 °C)-98.6 °F (37 °C)] 98 °F (36.7 °C)  Pulse:  [51-79] 79  Resp:  [2-20] 17  SpO2:  [91 %-99 %] 91 %  BP: (139-173)/(64-98) 149/98     Weight: 61.1 kg (134 lb 11.2 oz)  Body mass index is 27.21 kg/m².     SpO2: (!) 91 %  O2 Device (Oxygen Therapy): nasal cannula      Intake/Output Summary (Last 24 hours) at 12/16/2020 1252  Last data filed at 12/16/2020 0500  Gross per 24 hour   Intake 240 ml   Output 750 ml   Net -510 ml       Lines/Drains/Airways     Drain            Female External Urinary Catheter 12/13/20 1220 3 days          Peripheral Intravenous Line                 Peripheral IV - Single Lumen 12/13/20 1136 20 G Right Antecubital 3 days                Physical Exam    Constitutional: She is oriented to person, place, and time. She appears well-developed and well-nourished. No distress.   HENT:   Head: Normocephalic and atraumatic.   Eyes: Pupils are equal, round, and reactive to light.   Neck: Normal range of motion and full passive range of motion without pain. Neck supple. No JVD present.   Cardiovascular: Normal rate, S1 normal, S2 normal and intact distal pulses. An irregularly irregular rhythm present. PMI is not displaced. Exam reveals no distant heart sounds.   No murmur heard.  Pulses:       Radial pulses are 2+ on the right side and 2+ on the left side.        Dorsalis pedis pulses are 2+ on the right side and 2+ on the left side.   Remains in AFIB, Slow VR   Pulmonary/Chest: Effort normal and breath sounds normal. No accessory muscle usage. No respiratory distress. She has no decreased breath sounds. She has no wheezes. She has no rales.   Abdominal: Soft. Bowel sounds are normal. She exhibits no distension. There is no abdominal tenderness.   Musculoskeletal: Normal range of motion.         General: Edema present.      Right ankle: She exhibits swelling.      Left ankle: She exhibits swelling.      Comments: Trace LE edema   Neurological: She is alert and oriented to person, place, and time.   Skin: Skin is warm and dry. She is not diaphoretic. No cyanosis. Nails show no clubbing.   Psychiatric: She has a normal mood and affect. Her speech is normal and behavior is normal. Judgment and thought content normal. Cognition and memory are normal.   Nursing note and vitals reviewed.      Significant Labs:   BMP:   Recent Labs   Lab 12/15/20  0644 12/16/20  0701   GLU 83 85    142   K 3.7 3.5    99   CO2 38* 35*   BUN 17 17   CREATININE 1.6* 1.6*   CALCIUM 8.1* 8.6*   MG 1.6 1.6   , CBC   Recent Labs   Lab 12/15/20  0644   WBC 3.60*   HGB 9.5*   HCT 30.9*   *    and All pertinent lab results from the last 24 hours have been reviewed.    Significant  Imaging: Echocardiogram:   Transthoracic echo (TTE) complete (Cupid Only):   Results for orders placed or performed during the hospital encounter of 12/13/20   Echo Color Flow Doppler? Yes   Result Value Ref Range    BSA 1.64 m2    LA WIDTH 4.75 cm    PV PEAK VELOCITY 1.14 cm/s    LVIDd 4.51 3.5 - 6.0 cm    IVS 1.00 0.6 - 1.1 cm    Posterior Wall 0.99 0.6 - 1.1 cm    LVIDs 3.29 2.1 - 4.0 cm    FS 27 28 - 44 %    LA volume 116.73 cm3    Sinus 3.04 cm    STJ 2.23 cm    Ascending aorta 3.14 cm    LV mass 152.77 g    LA size 5.03 cm    TAPSE 0.96 cm    Left Ventricle Relative Wall Thickness 0.44 cm    AV mean gradient 6 mmHg    AV valve area 1.36 cm2    AV Velocity Ratio 0.45     AV index (prosthetic) 0.45     E/A ratio 3.93     E wave decelartion time 201.30 msec    IVRT 138.41 msec    LVOT diameter 1.96 cm    LVOT area 3.0 cm2    LVOT peak r[place] 0.78 m/s    LVOT peak VTI 18.45 cm    Ao peak [place] 1.72 m/s    Ao VTI 41.04 cm    Mr max [place] 0.06 m/s    LVOT stroke volume 55.64 cm3    AV peak gradient 12 mmHg    MV Peak E [place] 1.10 m/s    TR Max [place] 3.16 m/s    MV Peak A [place] 0.28 m/s    LV Systolic Volume 43.79 mL    LV Systolic Volume Index 27.5 mL/m2    LV Diastolic Volume 92.74 mL    LV Diastolic Volume Index 58.17 mL/m2    LA Volume Index 73.2 mL/m2    LV Mass Index 96 g/m2    RA Major Axis 6.27 cm    Left Atrium Minor Axis 6.08 cm    Left Atrium Major Axis 5.45 cm    Triscuspid Valve Regurgitation Peak Gradient 40 mmHg    RA Width 3.85 cm    Right Atrial Pressure (from IVC) 3 mmHg    TV rest pulmonary artery pressure 43 mmHg    Narrative    · The left ventricle is normal in size with mildly decreased systolic   function. The estimated ejection fraction is 40%.  · Severe left atrial enlargement.  · Grade III diastolic dysfunction.  · Normal right ventricular size with normal right ventricular systolic   function.  · Normal central venous pressure (3 mmHg).  · The estimated PA systolic pressure is 43 mmHg.  · There is  pulmonary hypertension.  · Small pericardial effusion.  · There are segmental left ventricular wall motion abnormalities.        Assessment and Plan:       * Acute diastolic congestive heart failure  CHF and anasarca    Echo in AM  Continue diuresis  DASh and fluid restriction    12/14/2020  -Continue IV lasix BID  -Avoid BB, CCB given bradycardia  -Dash diet,2  Gm sodium restriction  -1500 ml fluid restriction  -Daily weights  -Strict I/Os  -Assess response  -Continue ARB    12/15/2020  -continue IV lasix, ARB, Hydralazine  -Improved clinically today    Persistent atrial fibrillation  Bradycardia. Asymptomatic. Caused by Labetalol given in ER    Avoid BB and CCB  Start Heparin gtt    12/14/2020  -Continue IV heparin gtt for cardio-embolic protection  -Avoid BB, CCB given persistent afib with slow VR response  -Add Hydralazine 25 mg TID  -Tele monitoring  -Assess response in AM    12/15  -Ok to transition to low dose eliquis for cardio-embolic protection  -Continue Eliquis, Hydralazine  -Tele monitoring    12/16  -Continue Eliquis, Hydralazine  -Avoid BB or CCB given bradycardia this admission, resolved  -OP Cards follow up    Essential hypertension  Continue ARB, Hydralazine  Monitor Bp trend and adjust as needed.     Coronary artery disease involving native coronary artery  Continue ASA, Statin, ARB, Hydralazine      CKD (chronic kidney disease), stage III  Monitor closely given need for IV diuresis        VTE Risk Mitigation (From admission, onward)         Ordered     apixaban tablet 2.5 mg  2 times daily      12/15/20 0859     IP VTE HIGH RISK PATIENT  Once      12/13/20 1543     Place sequential compression device  Until discontinued      12/13/20 1543                HAROON Vásquez  Cardiology  [facility]

## 2020-12-16 NOTE — NURSING
Assumed care of the patient at this time from IMAN Henry. I agree with previously charted assessment this shift.

## 2020-12-16 NOTE — PLAN OF CARE
[facility] will deliver walker to pt's bedside.     Rene Quintanilla LMSW 12/16/2020 4:10 PM

## 2020-12-16 NOTE — PLAN OF CARE
Brianne Dempsey with [facility] said pt was approved for RW, however [facility] do not have any rollators in stock. She is sending the order to [facility].     Rene Quintanilla LMSW 12/16/2020 2:14 PM

## 2020-12-16 NOTE — PLAN OF CARE
Pt is alert, hard of hearing. VSS. Pt remained free of falls this shift. No complaints of pain or discomfort observed. Medications administered as ordered, monitored Blood pressure. Pt is a-fib on monitor. Hourly rounding completed. Pt instructed to call for assistance. POC was not reviewed with pt, unable to communicate in the language of the pt, as told by report that floor translation device does not understand her dialect of Hungarian.

## 2020-12-16 NOTE — ASSESSMENT & PLAN NOTE
Complicates communication. She speaks Upper sorbian but daughter reports that she doesn't always comprehend

## 2020-12-16 NOTE — SUBJECTIVE & OBJECTIVE
Interval History: no acute issues noted o/n. OK to discharge home from cardiology standpoint. Needs OP Cards follow up.     Review of Systems   Constitution: Positive for malaise/fatigue.   HENT: Negative for hearing loss and hoarse voice.    Eyes: Negative for blurred vision and visual disturbance.   Cardiovascular: Positive for irregular heartbeat and leg swelling. Negative for chest pain, claudication, dyspnea on exertion, near-syncope, orthopnea, palpitations, paroxysmal nocturnal dyspnea and syncope.   Respiratory: Negative for cough, hemoptysis, shortness of breath, sleep disturbances due to breathing, snoring and wheezing.    Endocrine: Negative for cold intolerance and heat intolerance.   Hematologic/Lymphatic: Does not bruise/bleed easily.   Skin: Negative for color change, dry skin and nail changes.   Musculoskeletal: Positive for back pain, joint pain and myalgias.   Gastrointestinal: Negative for bloating, abdominal pain, constipation, nausea and vomiting.   Genitourinary: Negative for dysuria, flank pain, hematuria and hesitancy.   Neurological: Negative for headaches, light-headedness, loss of balance, numbness, paresthesias and weakness.   Psychiatric/Behavioral: Negative for altered mental status.   Allergic/Immunologic: Positive for environmental allergies.     Objective:     Vital Signs (Most Recent):  Temp: 98 °F (36.7 °C) (12/16/20 0750)  Pulse: 79 (12/16/20 1104)  Resp: 17 (12/16/20 0750)  BP: (!) 149/98 (12/16/20 0750)  SpO2: (!) 91 % (12/16/20 0814) Vital Signs (24h Range):  Temp:  [97.6 °F (36.4 °C)-98.6 °F (37 °C)] 98 °F (36.7 °C)  Pulse:  [51-79] 79  Resp:  [2-20] 17  SpO2:  [91 %-99 %] 91 %  BP: (139-173)/(64-98) 149/98     Weight: 61.1 kg (134 lb 11.2 oz)  Body mass index is 27.21 kg/m².     SpO2: (!) 91 %  O2 Device (Oxygen Therapy): nasal cannula      Intake/Output Summary (Last 24 hours) at 12/16/2020 1252  Last data filed at 12/16/2020 0500  Gross per 24 hour   Intake 240 ml   Output  750 ml   Net -510 ml       Lines/Drains/Airways     Drain            Female External Urinary Catheter 12/13/20 1220 3 days          Peripheral Intravenous Line                 Peripheral IV - Single Lumen 12/13/20 1136 20 G Right Antecubital 3 days                Physical Exam   Constitutional: She is oriented to person, place, and time. She appears well-developed and well-nourished. No distress.   HENT:   Head: Normocephalic and atraumatic.   Eyes: Pupils are equal, round, and reactive to light.   Neck: Normal range of motion and full passive range of motion without pain. Neck supple. No JVD present.   Cardiovascular: Normal rate, S1 normal, S2 normal and intact distal pulses. An irregularly irregular rhythm present. PMI is not displaced. Exam reveals no distant heart sounds.   No murmur heard.  Pulses:       Radial pulses are 2+ on the right side and 2+ on the left side.        Dorsalis pedis pulses are 2+ on the right side and 2+ on the left side.   Remains in AFIB, Slow VR   Pulmonary/Chest: Effort normal and breath sounds normal. No accessory muscle usage. No respiratory distress. She has no decreased breath sounds. She has no wheezes. She has no rales.   Abdominal: Soft. Bowel sounds are normal. She exhibits no distension. There is no abdominal tenderness.   Musculoskeletal: Normal range of motion.         General: Edema present.      Right ankle: She exhibits swelling.      Left ankle: She exhibits swelling.      Comments: Trace LE edema   Neurological: She is alert and oriented to person, place, and time.   Skin: Skin is warm and dry. She is not diaphoretic. No cyanosis. Nails show no clubbing.   Psychiatric: She has a normal mood and affect. Her speech is normal and behavior is normal. Judgment and thought content normal. Cognition and memory are normal.   Nursing note and vitals reviewed.      Significant Labs:   BMP:   Recent Labs   Lab 12/15/20  0644 12/16/20  0701   GLU 83 85    142   K 3.7 3.5     99   CO2 38* 35*   BUN 17 17   CREATININE 1.6* 1.6*   CALCIUM 8.1* 8.6*   MG 1.6 1.6   , CBC   Recent Labs   Lab 12/15/20  0644   WBC 3.60*   HGB 9.5*   HCT 30.9*   *    and All pertinent lab results from the last 24 hours have been reviewed.    Significant Imaging: Echocardiogram:   Transthoracic echo (TTE) complete (Cupid Only):   Results for orders placed or performed during the hospital encounter of 12/13/20   Echo Color Flow Doppler? Yes   Result Value Ref Range    BSA 1.64 m2    LA WIDTH 4.75 cm    PV PEAK VELOCITY 1.14 cm/s    LVIDd 4.51 3.5 - 6.0 cm    IVS 1.00 0.6 - 1.1 cm    Posterior Wall 0.99 0.6 - 1.1 cm    LVIDs 3.29 2.1 - 4.0 cm    FS 27 28 - 44 %    LA volume 116.73 cm3    Sinus 3.04 cm    STJ 2.23 cm    Ascending aorta 3.14 cm    LV mass 152.77 g    LA size 5.03 cm    TAPSE 0.96 cm    Left Ventricle Relative Wall Thickness 0.44 cm    AV mean gradient 6 mmHg    AV valve area 1.36 cm2    AV Velocity Ratio 0.45     AV index (prosthetic) 0.45     E/A ratio 3.93     E wave decelartion time 201.30 msec    IVRT 138.41 msec    LVOT diameter 1.96 cm    LVOT area 3.0 cm2    LVOT peak [place] 0.78 m/s    LVOT peak VTI 18.45 cm    Ao peak [place] 1.72 m/s    Ao VTI 41.04 cm    Mr max [place] 0.06 m/s    LVOT stroke volume 55.64 cm3    AV peak gradient 12 mmHg    MV Peak E [place] 1.10 m/s    TR Max [place] 3.16 m/s    MV Peak A [place] 0.28 m/s    LV Systolic Volume 43.79 mL    LV Systolic Volume Index 27.5 mL/m2    LV Diastolic Volume 92.74 mL    LV Diastolic Volume Index 58.17 mL/m2    LA Volume Index 73.2 mL/m2    LV Mass Index 96 g/m2    RA Major Axis 6.27 cm    Left Atrium Minor Axis 6.08 cm    Left Atrium Major Axis 5.45 cm    Triscuspid Valve Regurgitation Peak Gradient 40 mmHg    RA Width 3.85 cm    Right Atrial Pressure (from IVC) 3 mmHg    TV rest pulmonary artery pressure 43 mmHg    Narrative    · The left ventricle is normal in size with mildly decreased systolic   function. The estimated ejection fraction  is 40%.  · Severe left atrial enlargement.  · Grade III diastolic dysfunction.  · Normal right ventricular size with normal right ventricular systolic   function.  · Normal central venous pressure (3 mmHg).  · The estimated PA systolic pressure is 43 mmHg.  · There is pulmonary hypertension.  · Small pericardial effusion.  · There are segmental left ventricular wall motion abnormalities.

## 2020-12-17 PROCEDURE — G0180 PR HOME HEALTH MD CERTIFICATION: ICD-10-PCS | Mod: ,,, | Performed by: FAMILY MEDICINE

## 2020-12-17 PROCEDURE — G0180 MD CERTIFICATION HHA PATIENT: HCPCS | Mod: ,,, | Performed by: FAMILY MEDICINE

## 2020-12-17 NOTE — NURSING
Patient, translating through Radha, friend,  has O2 at home, did not bring her portable tank.  lives 3 minutes away. SaO2 on room air is 89%. Dr. Becker notified.

## 2020-12-17 NOTE — NURSING
Orders received from Dr. Becker to discharge patient without portable O2 tank. Discharged via wheelchair to private transportation home.

## 2020-12-17 NOTE — NURSING
IV and cardiac monitor have been removed. Rollator at bedside, to be sent home with patient. Friend, Radha here to drive patient home. Patient noted to be on O2 @ 2L. Further reading in chart shows patient qualified for home O2. No O2 has been delivered. Will speak with MD.

## 2020-12-18 ENCOUNTER — PATIENT OUTREACH (OUTPATIENT)
Dept: ADMINISTRATIVE | Facility: CLINIC | Age: 85
End: 2020-12-18

## 2020-12-18 ENCOUNTER — TELEPHONE (OUTPATIENT)
Dept: PALLIATIVE MEDICINE | Facility: CLINIC | Age: 85
End: 2020-12-18

## 2020-12-18 NOTE — TELEPHONE ENCOUNTER
C3 nurse attempted to contact patient. No answer. The following message was left for the patient to return the call:  Good morning I am a nurse calling on behalf of [facility] from the Care Coordination Center.  This is a Transitional Care Call for Dana Wyman . When you have a moment please contact us at [phone] or [phone] Monday through Friday, between the hours of 8 am to 4 pm. We look forward to speaking with you. On behalf of [facility] have a nice day.    The patient does not have a scheduled HOSFU appointment within 7-14 days post hospital discharge date 12/16/20.

## 2020-12-18 NOTE — TELEPHONE ENCOUNTER
Palliative Care Referral Note:    Nurse attempted to contact patient regarding a Referral from His/Her Provider Dr/ NP/   No answer at time of this call, nurse will attempt to contact patient again regarding this appointment  Attempt # 1  of 3

## 2020-12-19 LAB
BACTERIA BLD CULT: NORMAL
BACTERIA BLD CULT: NORMAL

## 2020-12-21 ENCOUNTER — TELEPHONE (OUTPATIENT)
Dept: CARDIOLOGY | Facility: CLINIC | Age: 85
End: 2020-12-21

## 2020-12-21 NOTE — TELEPHONE ENCOUNTER
----- Message from Kayla Tubbs RN sent at 12/18/2020 11:59 AM CST -----  Regarding: chf 48 hour call

## 2020-12-24 ENCOUNTER — EXTERNAL HOME HEALTH (OUTPATIENT)
Dept: HOME HEALTH SERVICES | Facility: HOSPITAL | Age: 85
End: 2020-12-24
Payer: MEDICARE

## 2021-01-08 ENCOUNTER — CARE AT HOME (OUTPATIENT)
Dept: HOME HEALTH SERVICES | Facility: CLINIC | Age: 86
End: 2021-01-08
Payer: MEDICARE

## 2021-01-08 VITALS
DIASTOLIC BLOOD PRESSURE: 78 MMHG | SYSTOLIC BLOOD PRESSURE: 153 MMHG | RESPIRATION RATE: 18 BRPM | OXYGEN SATURATION: 98 % | HEART RATE: 78 BPM | TEMPERATURE: 98 F

## 2021-01-08 DIAGNOSIS — I50.31 ACUTE DIASTOLIC CONGESTIVE HEART FAILURE: ICD-10-CM

## 2021-01-08 PROCEDURE — 99350 HOME/RES VST EST HIGH MDM 60: CPT | Mod: ,,, | Performed by: NURSE PRACTITIONER

## 2021-01-08 PROCEDURE — 99350 PR HOME VISIT,ESTAB PATIENT,LEVEL IV: ICD-10-PCS | Mod: ,,, | Performed by: NURSE PRACTITIONER

## 2021-01-22 ENCOUNTER — HOSPITAL ENCOUNTER (INPATIENT)
Facility: HOSPITAL | Age: 86
LOS: 4 days | Discharge: HOSPICE | DRG: 291 | End: 2021-01-27
Attending: STUDENT IN AN ORGANIZED HEALTH CARE EDUCATION/TRAINING PROGRAM | Admitting: INTERNAL MEDICINE
Payer: MEDICARE

## 2021-01-22 DIAGNOSIS — I50.9 CHF EXACERBATION: ICD-10-CM

## 2021-01-22 DIAGNOSIS — R00.1 BRADYCARDIA: Primary | ICD-10-CM

## 2021-01-22 DIAGNOSIS — N17.9 ACUTE RENAL FAILURE SUPERIMPOSED ON STAGE 4 CHRONIC KIDNEY DISEASE, UNSPECIFIED ACUTE RENAL FAILURE TYPE: ICD-10-CM

## 2021-01-22 DIAGNOSIS — N18.4 ACUTE RENAL FAILURE SUPERIMPOSED ON STAGE 4 CHRONIC KIDNEY DISEASE, UNSPECIFIED ACUTE RENAL FAILURE TYPE: ICD-10-CM

## 2021-01-22 DIAGNOSIS — R60.1 ANASARCA: ICD-10-CM

## 2021-01-22 DIAGNOSIS — R06.02 SHORTNESS OF BREATH: ICD-10-CM

## 2021-01-22 DIAGNOSIS — I50.43 ACUTE ON CHRONIC COMBINED SYSTOLIC AND DIASTOLIC CONGESTIVE HEART FAILURE: ICD-10-CM

## 2021-01-22 PROCEDURE — 93010 EKG 12-LEAD: ICD-10-PCS | Mod: ,,, | Performed by: INTERNAL MEDICINE

## 2021-01-22 PROCEDURE — 93010 ELECTROCARDIOGRAM REPORT: CPT | Mod: ,,, | Performed by: INTERNAL MEDICINE

## 2021-01-22 PROCEDURE — 93005 ELECTROCARDIOGRAM TRACING: CPT

## 2021-01-22 RX ORDER — FUROSEMIDE 10 MG/ML
80 INJECTION INTRAMUSCULAR; INTRAVENOUS
Status: COMPLETED | OUTPATIENT
Start: 2021-01-23 | End: 2021-01-23

## 2021-01-23 PROBLEM — R06.02 SHORTNESS OF BREATH: Status: RESOLVED | Noted: 2021-01-23 | Resolved: 2021-01-23

## 2021-01-23 PROBLEM — D64.9 NORMOCYTIC ANEMIA: Status: ACTIVE | Noted: 2021-01-23

## 2021-01-23 PROBLEM — Z79.01 CHRONIC ANTICOAGULATION: Status: ACTIVE | Noted: 2021-01-23

## 2021-01-23 PROBLEM — N17.9 ACUTE RENAL FAILURE SUPERIMPOSED ON STAGE 4 CHRONIC KIDNEY DISEASE: Status: ACTIVE | Noted: 2018-09-22

## 2021-01-23 PROBLEM — R00.1 BRADYCARDIA: Status: ACTIVE | Noted: 2021-01-23

## 2021-01-23 PROBLEM — N18.4 CKD (CHRONIC KIDNEY DISEASE) STAGE 4, GFR 15-29 ML/MIN: Status: ACTIVE | Noted: 2018-09-22

## 2021-01-23 PROBLEM — R53.81 DEBILITY: Status: ACTIVE | Noted: 2021-01-23

## 2021-01-23 PROBLEM — E88.09 HYPOALBUMINEMIA: Status: ACTIVE | Noted: 2021-01-23

## 2021-01-23 PROBLEM — I50.43 ACUTE ON CHRONIC COMBINED SYSTOLIC AND DIASTOLIC CONGESTIVE HEART FAILURE: Status: ACTIVE | Noted: 2020-12-13

## 2021-01-23 PROBLEM — R06.02 SHORTNESS OF BREATH: Status: ACTIVE | Noted: 2021-01-23

## 2021-01-23 LAB
ALBUMIN SERPL BCP-MCNC: 2.9 G/DL (ref 3.5–5.2)
ALP SERPL-CCNC: 126 U/L (ref 55–135)
ALT SERPL W/O P-5'-P-CCNC: 23 U/L (ref 10–44)
ANION GAP SERPL CALC-SCNC: 8 MMOL/L (ref 8–16)
ANION GAP SERPL CALC-SCNC: 9 MMOL/L (ref 8–16)
AST SERPL-CCNC: 33 U/L (ref 10–40)
BASOPHILS # BLD AUTO: 0.02 K/UL (ref 0–0.2)
BASOPHILS # BLD AUTO: 0.03 K/UL (ref 0–0.2)
BASOPHILS NFR BLD: 0.4 % (ref 0–1.9)
BASOPHILS NFR BLD: 0.7 % (ref 0–1.9)
BILIRUB SERPL-MCNC: 0.5 MG/DL (ref 0.1–1)
BNP SERPL-MCNC: 4582 PG/ML (ref 0–99)
BUN SERPL-MCNC: 57 MG/DL (ref 8–23)
BUN SERPL-MCNC: 60 MG/DL (ref 8–23)
CALCIUM SERPL-MCNC: 8.9 MG/DL (ref 8.7–10.5)
CALCIUM SERPL-MCNC: 9.1 MG/DL (ref 8.7–10.5)
CHLORIDE SERPL-SCNC: 106 MMOL/L (ref 95–110)
CHLORIDE SERPL-SCNC: 106 MMOL/L (ref 95–110)
CO2 SERPL-SCNC: 25 MMOL/L (ref 23–29)
CO2 SERPL-SCNC: 26 MMOL/L (ref 23–29)
CREAT SERPL-MCNC: 2.2 MG/DL (ref 0.5–1.4)
CREAT SERPL-MCNC: 2.4 MG/DL (ref 0.5–1.4)
CTP QC/QA: YES
DIFFERENTIAL METHOD: ABNORMAL
DIFFERENTIAL METHOD: ABNORMAL
EOSINOPHIL # BLD AUTO: 0.1 K/UL (ref 0–0.5)
EOSINOPHIL # BLD AUTO: 0.2 K/UL (ref 0–0.5)
EOSINOPHIL NFR BLD: 1.9 % (ref 0–8)
EOSINOPHIL NFR BLD: 3.8 % (ref 0–8)
ERYTHROCYTE [DISTWIDTH] IN BLOOD BY AUTOMATED COUNT: 19.7 % (ref 11.5–14.5)
ERYTHROCYTE [DISTWIDTH] IN BLOOD BY AUTOMATED COUNT: 19.8 % (ref 11.5–14.5)
EST. GFR  (AFRICAN AMERICAN): 20 ML/MIN/1.73 M^2
EST. GFR  (AFRICAN AMERICAN): 22 ML/MIN/1.73 M^2
EST. GFR  (NON AFRICAN AMERICAN): 17 ML/MIN/1.73 M^2
EST. GFR  (NON AFRICAN AMERICAN): 19 ML/MIN/1.73 M^2
GLUCOSE SERPL-MCNC: 141 MG/DL (ref 70–110)
GLUCOSE SERPL-MCNC: 93 MG/DL (ref 70–110)
HCT VFR BLD AUTO: 34.3 % (ref 37–48.5)
HCT VFR BLD AUTO: 34.4 % (ref 37–48.5)
HGB BLD-MCNC: 10.2 G/DL (ref 12–16)
HGB BLD-MCNC: 10.2 G/DL (ref 12–16)
IMM GRANULOCYTES # BLD AUTO: 0.01 K/UL (ref 0–0.04)
IMM GRANULOCYTES # BLD AUTO: 0.01 K/UL (ref 0–0.04)
IMM GRANULOCYTES NFR BLD AUTO: 0.2 % (ref 0–0.5)
IMM GRANULOCYTES NFR BLD AUTO: 0.2 % (ref 0–0.5)
LYMPHOCYTES # BLD AUTO: 0.7 K/UL (ref 1–4.8)
LYMPHOCYTES # BLD AUTO: 1.1 K/UL (ref 1–4.8)
LYMPHOCYTES NFR BLD: 15.1 % (ref 18–48)
LYMPHOCYTES NFR BLD: 24.9 % (ref 18–48)
MAGNESIUM SERPL-MCNC: 2.5 MG/DL (ref 1.6–2.6)
MCH RBC QN AUTO: 26.6 PG (ref 27–31)
MCH RBC QN AUTO: 26.7 PG (ref 27–31)
MCHC RBC AUTO-ENTMCNC: 29.7 G/DL (ref 32–36)
MCHC RBC AUTO-ENTMCNC: 29.7 G/DL (ref 32–36)
MCV RBC AUTO: 90 FL (ref 82–98)
MCV RBC AUTO: 90 FL (ref 82–98)
MONOCYTES # BLD AUTO: 0.5 K/UL (ref 0.3–1)
MONOCYTES # BLD AUTO: 0.5 K/UL (ref 0.3–1)
MONOCYTES NFR BLD: 10.7 % (ref 4–15)
MONOCYTES NFR BLD: 9.3 % (ref 4–15)
NEUTROPHILS # BLD AUTO: 2.5 K/UL (ref 1.8–7.7)
NEUTROPHILS # BLD AUTO: 3.5 K/UL (ref 1.8–7.7)
NEUTROPHILS NFR BLD: 59.7 % (ref 38–73)
NEUTROPHILS NFR BLD: 73.1 % (ref 38–73)
NRBC BLD-RTO: 0 /100 WBC
NRBC BLD-RTO: 0 /100 WBC
PLATELET # BLD AUTO: 191 K/UL (ref 150–350)
PLATELET # BLD AUTO: 191 K/UL (ref 150–350)
PMV BLD AUTO: 11.7 FL (ref 9.2–12.9)
PMV BLD AUTO: 11.8 FL (ref 9.2–12.9)
POTASSIUM SERPL-SCNC: 4.6 MMOL/L (ref 3.5–5.1)
POTASSIUM SERPL-SCNC: 4.9 MMOL/L (ref 3.5–5.1)
PROT SERPL-MCNC: 7.7 G/DL (ref 6–8.4)
RBC # BLD AUTO: 3.82 M/UL (ref 4–5.4)
RBC # BLD AUTO: 3.83 M/UL (ref 4–5.4)
SARS-COV-2 RDRP RESP QL NAA+PROBE: NEGATIVE
SODIUM SERPL-SCNC: 139 MMOL/L (ref 136–145)
SODIUM SERPL-SCNC: 141 MMOL/L (ref 136–145)
TROPONIN I SERPL DL<=0.01 NG/ML-MCNC: 0.15 NG/ML (ref 0–0.03)
TROPONIN I SERPL DL<=0.01 NG/ML-MCNC: 0.16 NG/ML (ref 0–0.03)
TROPONIN I SERPL DL<=0.01 NG/ML-MCNC: 0.2 NG/ML (ref 0–0.03)
WBC # BLD AUTO: 4.22 K/UL (ref 3.9–12.7)
WBC # BLD AUTO: 4.84 K/UL (ref 3.9–12.7)

## 2021-01-23 PROCEDURE — 94761 N-INVAS EAR/PLS OXIMETRY MLT: CPT

## 2021-01-23 PROCEDURE — U0002 COVID-19 LAB TEST NON-CDC: HCPCS | Performed by: STUDENT IN AN ORGANIZED HEALTH CARE EDUCATION/TRAINING PROGRAM

## 2021-01-23 PROCEDURE — 97116 GAIT TRAINING THERAPY: CPT

## 2021-01-23 PROCEDURE — 63600175 PHARM REV CODE 636 W HCPCS: Performed by: NURSE PRACTITIONER

## 2021-01-23 PROCEDURE — 83880 ASSAY OF NATRIURETIC PEPTIDE: CPT

## 2021-01-23 PROCEDURE — 63600175 PHARM REV CODE 636 W HCPCS: Performed by: STUDENT IN AN ORGANIZED HEALTH CARE EDUCATION/TRAINING PROGRAM

## 2021-01-23 PROCEDURE — 84484 ASSAY OF TROPONIN QUANT: CPT | Mod: 91

## 2021-01-23 PROCEDURE — 85025 COMPLETE CBC W/AUTO DIFF WBC: CPT

## 2021-01-23 PROCEDURE — 99233 PR SUBSEQUENT HOSPITAL CARE,LEVL III: ICD-10-PCS | Mod: ,,, | Performed by: INTERNAL MEDICINE

## 2021-01-23 PROCEDURE — 99285 EMERGENCY DEPT VISIT HI MDM: CPT | Mod: 25

## 2021-01-23 PROCEDURE — 83735 ASSAY OF MAGNESIUM: CPT

## 2021-01-23 PROCEDURE — 25000003 PHARM REV CODE 250: Performed by: NURSE PRACTITIONER

## 2021-01-23 PROCEDURE — 97161 PT EVAL LOW COMPLEX 20 MIN: CPT

## 2021-01-23 PROCEDURE — 11000001 HC ACUTE MED/SURG PRIVATE ROOM

## 2021-01-23 PROCEDURE — 99233 SBSQ HOSP IP/OBS HIGH 50: CPT | Mod: ,,, | Performed by: INTERNAL MEDICINE

## 2021-01-23 PROCEDURE — 36415 COLL VENOUS BLD VENIPUNCTURE: CPT

## 2021-01-23 PROCEDURE — 96374 THER/PROPH/DIAG INJ IV PUSH: CPT

## 2021-01-23 PROCEDURE — 80048 BASIC METABOLIC PNL TOTAL CA: CPT

## 2021-01-23 PROCEDURE — 80053 COMPREHEN METABOLIC PANEL: CPT

## 2021-01-23 PROCEDURE — 27000221 HC OXYGEN, UP TO 24 HOURS

## 2021-01-23 PROCEDURE — 25000003 PHARM REV CODE 250: Performed by: INTERNAL MEDICINE

## 2021-01-23 PROCEDURE — 84484 ASSAY OF TROPONIN QUANT: CPT

## 2021-01-23 RX ORDER — HYDRALAZINE HYDROCHLORIDE 25 MG/1
50 TABLET, FILM COATED ORAL EVERY 8 HOURS
Status: DISCONTINUED | OUTPATIENT
Start: 2021-01-23 | End: 2021-01-27 | Stop reason: HOSPADM

## 2021-01-23 RX ORDER — ATORVASTATIN CALCIUM 40 MG/1
40 TABLET, FILM COATED ORAL DAILY
Status: DISCONTINUED | OUTPATIENT
Start: 2021-01-23 | End: 2021-01-27 | Stop reason: HOSPADM

## 2021-01-23 RX ORDER — LOSARTAN POTASSIUM 25 MG/1
25 TABLET ORAL DAILY
Status: DISCONTINUED | OUTPATIENT
Start: 2021-01-23 | End: 2021-01-23

## 2021-01-23 RX ORDER — PANTOPRAZOLE SODIUM 40 MG/1
40 TABLET, DELAYED RELEASE ORAL DAILY
Status: DISCONTINUED | OUTPATIENT
Start: 2021-01-23 | End: 2021-01-27 | Stop reason: HOSPADM

## 2021-01-23 RX ORDER — ONDANSETRON 2 MG/ML
4 INJECTION INTRAMUSCULAR; INTRAVENOUS EVERY 6 HOURS PRN
Status: DISCONTINUED | OUTPATIENT
Start: 2021-01-23 | End: 2021-01-27 | Stop reason: HOSPADM

## 2021-01-23 RX ORDER — CLOPIDOGREL BISULFATE 75 MG/1
75 TABLET ORAL DAILY
Status: DISCONTINUED | OUTPATIENT
Start: 2021-01-23 | End: 2021-01-27 | Stop reason: HOSPADM

## 2021-01-23 RX ORDER — HYDRALAZINE HYDROCHLORIDE 20 MG/ML
10 INJECTION INTRAMUSCULAR; INTRAVENOUS EVERY 6 HOURS PRN
Status: DISCONTINUED | OUTPATIENT
Start: 2021-01-23 | End: 2021-01-27 | Stop reason: HOSPADM

## 2021-01-23 RX ORDER — FUROSEMIDE 10 MG/ML
40 INJECTION INTRAMUSCULAR; INTRAVENOUS
Status: DISCONTINUED | OUTPATIENT
Start: 2021-01-23 | End: 2021-01-27 | Stop reason: HOSPADM

## 2021-01-23 RX ORDER — PAROXETINE 10 MG/1
20 TABLET, FILM COATED ORAL DAILY
Status: DISCONTINUED | OUTPATIENT
Start: 2021-01-23 | End: 2021-01-27 | Stop reason: HOSPADM

## 2021-01-23 RX ORDER — METOLAZONE 5 MG/1
5 TABLET ORAL DAILY
Status: DISCONTINUED | OUTPATIENT
Start: 2021-01-23 | End: 2021-01-27 | Stop reason: HOSPADM

## 2021-01-23 RX ORDER — ACETAMINOPHEN 325 MG/1
650 TABLET ORAL EVERY 6 HOURS PRN
Status: DISCONTINUED | OUTPATIENT
Start: 2021-01-23 | End: 2021-01-27 | Stop reason: HOSPADM

## 2021-01-23 RX ADMIN — FUROSEMIDE 40 MG: 10 INJECTION, SOLUTION INTRAVENOUS at 05:01

## 2021-01-23 RX ADMIN — PAROXETINE HYDROCHLORIDE 20 MG: 10 TABLET, FILM COATED ORAL at 08:01

## 2021-01-23 RX ADMIN — HYDRALAZINE HYDROCHLORIDE 50 MG: 25 TABLET, FILM COATED ORAL at 09:01

## 2021-01-23 RX ADMIN — APIXABAN 2.5 MG: 2.5 TABLET, FILM COATED ORAL at 08:01

## 2021-01-23 RX ADMIN — CLOPIDOGREL 75 MG: 75 TABLET, FILM COATED ORAL at 08:01

## 2021-01-23 RX ADMIN — Medication 1 TABLET: at 11:01

## 2021-01-23 RX ADMIN — ATORVASTATIN CALCIUM 40 MG: 40 TABLET, FILM COATED ORAL at 08:01

## 2021-01-23 RX ADMIN — HYDRALAZINE HYDROCHLORIDE 50 MG: 25 TABLET, FILM COATED ORAL at 05:01

## 2021-01-23 RX ADMIN — HYDRALAZINE HYDROCHLORIDE 10 MG: 20 INJECTION, SOLUTION INTRAMUSCULAR; INTRAVENOUS at 05:01

## 2021-01-23 RX ADMIN — HYDRALAZINE HYDROCHLORIDE 10 MG: 20 INJECTION, SOLUTION INTRAMUSCULAR; INTRAVENOUS at 11:01

## 2021-01-23 RX ADMIN — APIXABAN 2.5 MG: 2.5 TABLET, FILM COATED ORAL at 09:01

## 2021-01-23 RX ADMIN — PANTOPRAZOLE SODIUM 40 MG: 40 TABLET, DELAYED RELEASE ORAL at 08:01

## 2021-01-23 RX ADMIN — HYDRALAZINE HYDROCHLORIDE 50 MG: 25 TABLET, FILM COATED ORAL at 01:01

## 2021-01-23 RX ADMIN — FUROSEMIDE 80 MG: 10 INJECTION, SOLUTION INTRAMUSCULAR; INTRAVENOUS at 12:01

## 2021-01-23 RX ADMIN — METOLAZONE 5 MG: 5 TABLET ORAL at 08:01

## 2021-01-23 RX ADMIN — ACETAMINOPHEN 650 MG: 325 TABLET ORAL at 11:01

## 2021-01-24 PROBLEM — J81.0 ACUTE PULMONARY EDEMA: Status: ACTIVE | Noted: 2021-01-24

## 2021-01-24 LAB
ALBUMIN SERPL BCP-MCNC: 3 G/DL (ref 3.5–5.2)
ALP SERPL-CCNC: 112 U/L (ref 55–135)
ALT SERPL W/O P-5'-P-CCNC: 21 U/L (ref 10–44)
ANION GAP SERPL CALC-SCNC: 13 MMOL/L (ref 8–16)
AST SERPL-CCNC: 29 U/L (ref 10–40)
BILIRUB SERPL-MCNC: 0.8 MG/DL (ref 0.1–1)
BUN SERPL-MCNC: 59 MG/DL (ref 8–23)
CALCIUM SERPL-MCNC: 9.2 MG/DL (ref 8.7–10.5)
CHLORIDE SERPL-SCNC: 104 MMOL/L (ref 95–110)
CO2 SERPL-SCNC: 24 MMOL/L (ref 23–29)
CREAT SERPL-MCNC: 2.3 MG/DL (ref 0.5–1.4)
EST. GFR  (AFRICAN AMERICAN): 21 ML/MIN/1.73 M^2
EST. GFR  (NON AFRICAN AMERICAN): 18 ML/MIN/1.73 M^2
GLUCOSE SERPL-MCNC: 151 MG/DL (ref 70–110)
MAGNESIUM SERPL-MCNC: 2.5 MG/DL (ref 1.6–2.6)
POTASSIUM SERPL-SCNC: 4.4 MMOL/L (ref 3.5–5.1)
PROT SERPL-MCNC: 7.6 G/DL (ref 6–8.4)
SODIUM SERPL-SCNC: 141 MMOL/L (ref 136–145)

## 2021-01-24 PROCEDURE — 97116 GAIT TRAINING THERAPY: CPT

## 2021-01-24 PROCEDURE — 99233 PR SUBSEQUENT HOSPITAL CARE,LEVL III: ICD-10-PCS | Mod: ,,, | Performed by: INTERNAL MEDICINE

## 2021-01-24 PROCEDURE — 99900035 HC TECH TIME PER 15 MIN (STAT)

## 2021-01-24 PROCEDURE — 25000003 PHARM REV CODE 250: Performed by: NURSE PRACTITIONER

## 2021-01-24 PROCEDURE — 11000001 HC ACUTE MED/SURG PRIVATE ROOM

## 2021-01-24 PROCEDURE — 97530 THERAPEUTIC ACTIVITIES: CPT

## 2021-01-24 PROCEDURE — 63600175 PHARM REV CODE 636 W HCPCS: Performed by: NURSE PRACTITIONER

## 2021-01-24 PROCEDURE — 25000003 PHARM REV CODE 250: Performed by: INTERNAL MEDICINE

## 2021-01-24 PROCEDURE — 80053 COMPREHEN METABOLIC PANEL: CPT

## 2021-01-24 PROCEDURE — 99233 SBSQ HOSP IP/OBS HIGH 50: CPT | Mod: ,,, | Performed by: INTERNAL MEDICINE

## 2021-01-24 PROCEDURE — 83735 ASSAY OF MAGNESIUM: CPT

## 2021-01-24 PROCEDURE — 27000221 HC OXYGEN, UP TO 24 HOURS

## 2021-01-24 PROCEDURE — 94761 N-INVAS EAR/PLS OXIMETRY MLT: CPT

## 2021-01-24 RX ADMIN — HYDRALAZINE HYDROCHLORIDE 50 MG: 25 TABLET, FILM COATED ORAL at 05:01

## 2021-01-24 RX ADMIN — FUROSEMIDE 40 MG: 10 INJECTION, SOLUTION INTRAVENOUS at 05:01

## 2021-01-24 RX ADMIN — Medication 1 TABLET: at 08:01

## 2021-01-24 RX ADMIN — PAROXETINE HYDROCHLORIDE 20 MG: 10 TABLET, FILM COATED ORAL at 08:01

## 2021-01-24 RX ADMIN — HYDRALAZINE HYDROCHLORIDE 50 MG: 25 TABLET, FILM COATED ORAL at 01:01

## 2021-01-24 RX ADMIN — METOLAZONE 5 MG: 5 TABLET ORAL at 08:01

## 2021-01-24 RX ADMIN — APIXABAN 2.5 MG: 2.5 TABLET, FILM COATED ORAL at 08:01

## 2021-01-24 RX ADMIN — PANTOPRAZOLE SODIUM 40 MG: 40 TABLET, DELAYED RELEASE ORAL at 08:01

## 2021-01-24 RX ADMIN — HYDRALAZINE HYDROCHLORIDE 10 MG: 20 INJECTION, SOLUTION INTRAMUSCULAR; INTRAVENOUS at 05:01

## 2021-01-24 RX ADMIN — CLOPIDOGREL 75 MG: 75 TABLET, FILM COATED ORAL at 08:01

## 2021-01-24 RX ADMIN — ATORVASTATIN CALCIUM 40 MG: 40 TABLET, FILM COATED ORAL at 08:01

## 2021-01-24 RX ADMIN — HYDRALAZINE HYDROCHLORIDE 50 MG: 25 TABLET, FILM COATED ORAL at 10:01

## 2021-01-25 PROBLEM — F03.90 DEMENTIA WITHOUT BEHAVIORAL DISTURBANCE: Status: ACTIVE | Noted: 2021-01-25

## 2021-01-25 LAB
ANION GAP SERPL CALC-SCNC: 15 MMOL/L (ref 8–16)
BUN SERPL-MCNC: 57 MG/DL (ref 8–23)
CALCIUM SERPL-MCNC: 9.5 MG/DL (ref 8.7–10.5)
CHLORIDE SERPL-SCNC: 101 MMOL/L (ref 95–110)
CO2 SERPL-SCNC: 27 MMOL/L (ref 23–29)
CREAT SERPL-MCNC: 2.3 MG/DL (ref 0.5–1.4)
EST. GFR  (AFRICAN AMERICAN): 21 ML/MIN/1.73 M^2
EST. GFR  (NON AFRICAN AMERICAN): 18 ML/MIN/1.73 M^2
GLUCOSE SERPL-MCNC: 127 MG/DL (ref 70–110)
POTASSIUM SERPL-SCNC: 4.1 MMOL/L (ref 3.5–5.1)
SODIUM SERPL-SCNC: 143 MMOL/L (ref 136–145)

## 2021-01-25 PROCEDURE — 94761 N-INVAS EAR/PLS OXIMETRY MLT: CPT

## 2021-01-25 PROCEDURE — 11000001 HC ACUTE MED/SURG PRIVATE ROOM

## 2021-01-25 PROCEDURE — 27000221 HC OXYGEN, UP TO 24 HOURS

## 2021-01-25 PROCEDURE — 97530 THERAPEUTIC ACTIVITIES: CPT

## 2021-01-25 PROCEDURE — 25000003 PHARM REV CODE 250: Performed by: NURSE PRACTITIONER

## 2021-01-25 PROCEDURE — 63600175 PHARM REV CODE 636 W HCPCS: Performed by: NURSE PRACTITIONER

## 2021-01-25 PROCEDURE — 99900035 HC TECH TIME PER 15 MIN (STAT)

## 2021-01-25 PROCEDURE — 80048 BASIC METABOLIC PNL TOTAL CA: CPT

## 2021-01-25 PROCEDURE — 36415 COLL VENOUS BLD VENIPUNCTURE: CPT

## 2021-01-25 PROCEDURE — 25000003 PHARM REV CODE 250: Performed by: INTERNAL MEDICINE

## 2021-01-25 RX ADMIN — PANTOPRAZOLE SODIUM 40 MG: 40 TABLET, DELAYED RELEASE ORAL at 08:01

## 2021-01-25 RX ADMIN — METOLAZONE 5 MG: 5 TABLET ORAL at 08:01

## 2021-01-25 RX ADMIN — ATORVASTATIN CALCIUM 40 MG: 40 TABLET, FILM COATED ORAL at 08:01

## 2021-01-25 RX ADMIN — HYDRALAZINE HYDROCHLORIDE 50 MG: 25 TABLET, FILM COATED ORAL at 05:01

## 2021-01-25 RX ADMIN — PAROXETINE HYDROCHLORIDE 20 MG: 10 TABLET, FILM COATED ORAL at 08:01

## 2021-01-25 RX ADMIN — APIXABAN 2.5 MG: 2.5 TABLET, FILM COATED ORAL at 10:01

## 2021-01-25 RX ADMIN — APIXABAN 2.5 MG: 2.5 TABLET, FILM COATED ORAL at 08:01

## 2021-01-25 RX ADMIN — FUROSEMIDE 40 MG: 10 INJECTION, SOLUTION INTRAVENOUS at 05:01

## 2021-01-25 RX ADMIN — Medication 1 TABLET: at 08:01

## 2021-01-25 RX ADMIN — HYDRALAZINE HYDROCHLORIDE 50 MG: 25 TABLET, FILM COATED ORAL at 10:01

## 2021-01-25 RX ADMIN — CLOPIDOGREL 75 MG: 75 TABLET, FILM COATED ORAL at 08:01

## 2021-01-25 RX ADMIN — HYDRALAZINE HYDROCHLORIDE 50 MG: 25 TABLET, FILM COATED ORAL at 01:01

## 2021-01-25 RX ADMIN — HYDRALAZINE HYDROCHLORIDE 10 MG: 20 INJECTION, SOLUTION INTRAMUSCULAR; INTRAVENOUS at 05:01

## 2021-01-26 LAB
ANION GAP SERPL CALC-SCNC: 10 MMOL/L (ref 8–16)
BASOPHILS # BLD AUTO: 0.02 K/UL (ref 0–0.2)
BASOPHILS NFR BLD: 0.4 % (ref 0–1.9)
BUN SERPL-MCNC: 54 MG/DL (ref 8–23)
CALCIUM SERPL-MCNC: 9.4 MG/DL (ref 8.7–10.5)
CHLORIDE SERPL-SCNC: 97 MMOL/L (ref 95–110)
CO2 SERPL-SCNC: 35 MMOL/L (ref 23–29)
CREAT SERPL-MCNC: 2.2 MG/DL (ref 0.5–1.4)
DIFFERENTIAL METHOD: ABNORMAL
EOSINOPHIL # BLD AUTO: 0 K/UL (ref 0–0.5)
EOSINOPHIL NFR BLD: 0.2 % (ref 0–8)
ERYTHROCYTE [DISTWIDTH] IN BLOOD BY AUTOMATED COUNT: 19.8 % (ref 11.5–14.5)
EST. GFR  (AFRICAN AMERICAN): 22 ML/MIN/1.73 M^2
EST. GFR  (NON AFRICAN AMERICAN): 19 ML/MIN/1.73 M^2
GLUCOSE SERPL-MCNC: 130 MG/DL (ref 70–110)
HCT VFR BLD AUTO: 31.9 % (ref 37–48.5)
HGB BLD-MCNC: 9.6 G/DL (ref 12–16)
IMM GRANULOCYTES # BLD AUTO: 0.02 K/UL (ref 0–0.04)
IMM GRANULOCYTES NFR BLD AUTO: 0.4 % (ref 0–0.5)
LYMPHOCYTES # BLD AUTO: 0.7 K/UL (ref 1–4.8)
LYMPHOCYTES NFR BLD: 15 % (ref 18–48)
MCH RBC QN AUTO: 26.5 PG (ref 27–31)
MCHC RBC AUTO-ENTMCNC: 30.1 G/DL (ref 32–36)
MCV RBC AUTO: 88 FL (ref 82–98)
MONOCYTES # BLD AUTO: 0.5 K/UL (ref 0.3–1)
MONOCYTES NFR BLD: 9.7 % (ref 4–15)
NEUTROPHILS # BLD AUTO: 3.5 K/UL (ref 1.8–7.7)
NEUTROPHILS NFR BLD: 74.3 % (ref 38–73)
NRBC BLD-RTO: 0 /100 WBC
PLATELET # BLD AUTO: 165 K/UL (ref 150–350)
PMV BLD AUTO: 11.7 FL (ref 9.2–12.9)
POTASSIUM SERPL-SCNC: 3.4 MMOL/L (ref 3.5–5.1)
RBC # BLD AUTO: 3.62 M/UL (ref 4–5.4)
SODIUM SERPL-SCNC: 142 MMOL/L (ref 136–145)
WBC # BLD AUTO: 4.66 K/UL (ref 3.9–12.7)

## 2021-01-26 PROCEDURE — 11000001 HC ACUTE MED/SURG PRIVATE ROOM

## 2021-01-26 PROCEDURE — 80048 BASIC METABOLIC PNL TOTAL CA: CPT

## 2021-01-26 PROCEDURE — 27000221 HC OXYGEN, UP TO 24 HOURS

## 2021-01-26 PROCEDURE — 85025 COMPLETE CBC W/AUTO DIFF WBC: CPT

## 2021-01-26 PROCEDURE — 63600175 PHARM REV CODE 636 W HCPCS: Performed by: NURSE PRACTITIONER

## 2021-01-26 PROCEDURE — 25000003 PHARM REV CODE 250: Performed by: NURSE PRACTITIONER

## 2021-01-26 PROCEDURE — 97110 THERAPEUTIC EXERCISES: CPT

## 2021-01-26 PROCEDURE — 94761 N-INVAS EAR/PLS OXIMETRY MLT: CPT

## 2021-01-26 PROCEDURE — 97530 THERAPEUTIC ACTIVITIES: CPT

## 2021-01-26 PROCEDURE — 25000003 PHARM REV CODE 250: Performed by: INTERNAL MEDICINE

## 2021-01-26 PROCEDURE — 36415 COLL VENOUS BLD VENIPUNCTURE: CPT

## 2021-01-26 RX ORDER — POTASSIUM CHLORIDE 20 MEQ/1
20 TABLET, EXTENDED RELEASE ORAL ONCE
Status: COMPLETED | OUTPATIENT
Start: 2021-01-26 | End: 2021-01-26

## 2021-01-26 RX ADMIN — POTASSIUM CHLORIDE 20 MEQ: 1500 TABLET, EXTENDED RELEASE ORAL at 05:01

## 2021-01-26 RX ADMIN — ACETAMINOPHEN 650 MG: 325 TABLET ORAL at 08:01

## 2021-01-26 RX ADMIN — FUROSEMIDE 40 MG: 10 INJECTION, SOLUTION INTRAVENOUS at 05:01

## 2021-01-26 RX ADMIN — Medication 1 TABLET: at 08:01

## 2021-01-26 RX ADMIN — CLOPIDOGREL 75 MG: 75 TABLET, FILM COATED ORAL at 08:01

## 2021-01-26 RX ADMIN — APIXABAN 2.5 MG: 2.5 TABLET, FILM COATED ORAL at 08:01

## 2021-01-26 RX ADMIN — HYDRALAZINE HYDROCHLORIDE 50 MG: 25 TABLET, FILM COATED ORAL at 01:01

## 2021-01-26 RX ADMIN — HYDRALAZINE HYDROCHLORIDE 50 MG: 25 TABLET, FILM COATED ORAL at 08:01

## 2021-01-26 RX ADMIN — PANTOPRAZOLE SODIUM 40 MG: 40 TABLET, DELAYED RELEASE ORAL at 08:01

## 2021-01-26 RX ADMIN — METOLAZONE 5 MG: 5 TABLET ORAL at 08:01

## 2021-01-26 RX ADMIN — ATORVASTATIN CALCIUM 40 MG: 40 TABLET, FILM COATED ORAL at 08:01

## 2021-01-26 RX ADMIN — PAROXETINE HYDROCHLORIDE 20 MG: 10 TABLET, FILM COATED ORAL at 08:01

## 2021-01-26 RX ADMIN — HYDRALAZINE HYDROCHLORIDE 50 MG: 25 TABLET, FILM COATED ORAL at 05:01

## 2021-01-26 RX ADMIN — HYDRALAZINE HYDROCHLORIDE 10 MG: 20 INJECTION, SOLUTION INTRAMUSCULAR; INTRAVENOUS at 11:01

## 2021-01-27 VITALS
WEIGHT: 139.31 LBS | HEART RATE: 73 BPM | RESPIRATION RATE: 18 BRPM | OXYGEN SATURATION: 95 % | HEIGHT: 59 IN | SYSTOLIC BLOOD PRESSURE: 176 MMHG | BODY MASS INDEX: 28.08 KG/M2 | DIASTOLIC BLOOD PRESSURE: 74 MMHG | TEMPERATURE: 99 F

## 2021-01-27 PROBLEM — J81.0 ACUTE PULMONARY EDEMA: Status: RESOLVED | Noted: 2021-01-24 | Resolved: 2021-01-27

## 2021-01-27 LAB
ANION GAP SERPL CALC-SCNC: 9 MMOL/L (ref 8–16)
BUN SERPL-MCNC: 50 MG/DL (ref 8–23)
CALCIUM SERPL-MCNC: 9.3 MG/DL (ref 8.7–10.5)
CHLORIDE SERPL-SCNC: 93 MMOL/L (ref 95–110)
CO2 SERPL-SCNC: 38 MMOL/L (ref 23–29)
CREAT SERPL-MCNC: 2.1 MG/DL (ref 0.5–1.4)
EST. GFR  (AFRICAN AMERICAN): 23 ML/MIN/1.73 M^2
EST. GFR  (NON AFRICAN AMERICAN): 20 ML/MIN/1.73 M^2
GLUCOSE SERPL-MCNC: 101 MG/DL (ref 70–110)
POTASSIUM SERPL-SCNC: 3.4 MMOL/L (ref 3.5–5.1)
SODIUM SERPL-SCNC: 140 MMOL/L (ref 136–145)

## 2021-01-27 PROCEDURE — 25000003 PHARM REV CODE 250: Performed by: NURSE PRACTITIONER

## 2021-01-27 PROCEDURE — 36415 COLL VENOUS BLD VENIPUNCTURE: CPT

## 2021-01-27 PROCEDURE — 27000221 HC OXYGEN, UP TO 24 HOURS

## 2021-01-27 PROCEDURE — 99900035 HC TECH TIME PER 15 MIN (STAT)

## 2021-01-27 PROCEDURE — 25000003 PHARM REV CODE 250: Performed by: INTERNAL MEDICINE

## 2021-01-27 PROCEDURE — 80048 BASIC METABOLIC PNL TOTAL CA: CPT

## 2021-01-27 PROCEDURE — 94761 N-INVAS EAR/PLS OXIMETRY MLT: CPT

## 2021-01-27 PROCEDURE — 97530 THERAPEUTIC ACTIVITIES: CPT

## 2021-01-27 PROCEDURE — 63600175 PHARM REV CODE 636 W HCPCS: Performed by: NURSE PRACTITIONER

## 2021-01-27 RX ORDER — ACETAMINOPHEN 325 MG/1
650 TABLET ORAL EVERY 6 HOURS PRN
Refills: 0
Start: 2021-01-27

## 2021-01-27 RX ORDER — METOLAZONE 5 MG/1
5 TABLET ORAL DAILY
Qty: 30 TABLET | Refills: 0 | Status: SHIPPED | OUTPATIENT
Start: 2021-01-27 | End: 2021-01-28

## 2021-01-27 RX ORDER — FUROSEMIDE 40 MG/1
40 TABLET ORAL 2 TIMES DAILY
Qty: 60 TABLET | Refills: 0 | Status: SHIPPED | OUTPATIENT
Start: 2021-01-27 | End: 2021-01-28

## 2021-01-27 RX ORDER — POTASSIUM CHLORIDE 20 MEQ/1
20 TABLET, EXTENDED RELEASE ORAL DAILY
Qty: 30 TABLET | Refills: 0 | Status: SHIPPED | OUTPATIENT
Start: 2021-01-27 | End: 2021-01-28

## 2021-01-27 RX ADMIN — ATORVASTATIN CALCIUM 40 MG: 40 TABLET, FILM COATED ORAL at 09:01

## 2021-01-27 RX ADMIN — METOLAZONE 5 MG: 5 TABLET ORAL at 09:01

## 2021-01-27 RX ADMIN — APIXABAN 2.5 MG: 2.5 TABLET, FILM COATED ORAL at 09:01

## 2021-01-27 RX ADMIN — CLOPIDOGREL 75 MG: 75 TABLET, FILM COATED ORAL at 09:01

## 2021-01-27 RX ADMIN — PANTOPRAZOLE SODIUM 40 MG: 40 TABLET, DELAYED RELEASE ORAL at 09:01

## 2021-01-27 RX ADMIN — HYDRALAZINE HYDROCHLORIDE 50 MG: 25 TABLET, FILM COATED ORAL at 06:01

## 2021-01-27 RX ADMIN — PAROXETINE HYDROCHLORIDE 20 MG: 10 TABLET, FILM COATED ORAL at 09:01

## 2021-01-27 RX ADMIN — FUROSEMIDE 40 MG: 10 INJECTION, SOLUTION INTRAVENOUS at 06:01

## 2021-01-27 RX ADMIN — Medication 1 TABLET: at 09:01
